# Patient Record
Sex: MALE | Race: WHITE | NOT HISPANIC OR LATINO | Employment: OTHER | URBAN - METROPOLITAN AREA
[De-identification: names, ages, dates, MRNs, and addresses within clinical notes are randomized per-mention and may not be internally consistent; named-entity substitution may affect disease eponyms.]

---

## 2024-07-25 ENCOUNTER — APPOINTMENT (OUTPATIENT)
Dept: GENERAL RADIOLOGY | Facility: CLINIC | Age: 59
End: 2024-07-25
Attending: EMERGENCY MEDICINE
Payer: COMMERCIAL

## 2024-07-25 ENCOUNTER — APPOINTMENT (OUTPATIENT)
Dept: CARDIOLOGY | Facility: CLINIC | Age: 59
End: 2024-07-25
Attending: NURSE PRACTITIONER
Payer: COMMERCIAL

## 2024-07-25 ENCOUNTER — HOSPITAL ENCOUNTER (OUTPATIENT)
Facility: CLINIC | Age: 59
Setting detail: OBSERVATION
Discharge: HOME | End: 2024-07-26
Attending: EMERGENCY MEDICINE | Admitting: STUDENT IN AN ORGANIZED HEALTH CARE EDUCATION/TRAINING PROGRAM
Payer: COMMERCIAL

## 2024-07-25 DIAGNOSIS — R07.9 CHEST PAIN, UNSPECIFIED TYPE: ICD-10-CM

## 2024-07-25 DIAGNOSIS — I20.0 UNSTABLE ANGINA (H): ICD-10-CM

## 2024-07-25 LAB
ACT BLD: 308 SECONDS (ref 74–150)
ANION GAP SERPL CALCULATED.3IONS-SCNC: 9 MMOL/L (ref 7–15)
ATRIAL RATE - MUSE: 72 BPM
BASOPHILS # BLD AUTO: 0.1 10E3/UL (ref 0–0.2)
BASOPHILS NFR BLD AUTO: 1 %
BUN SERPL-MCNC: 17.3 MG/DL (ref 6–20)
CALCIUM SERPL-MCNC: 9.2 MG/DL (ref 8.8–10.4)
CHLORIDE SERPL-SCNC: 105 MMOL/L (ref 98–107)
CHOLEST SERPL-MCNC: 130 MG/DL
CREAT SERPL-MCNC: 1.21 MG/DL (ref 0.67–1.17)
DIASTOLIC BLOOD PRESSURE - MUSE: NORMAL MMHG
EGFRCR SERPLBLD CKD-EPI 2021: 70 ML/MIN/1.73M2
EOSINOPHIL # BLD AUTO: 0.1 10E3/UL (ref 0–0.7)
EOSINOPHIL NFR BLD AUTO: 1 %
ERYTHROCYTE [DISTWIDTH] IN BLOOD BY AUTOMATED COUNT: 12.6 % (ref 10–15)
GLUCOSE SERPL-MCNC: 102 MG/DL (ref 70–99)
HBA1C MFR BLD: 5.1 %
HCO3 SERPL-SCNC: 24 MMOL/L (ref 22–29)
HCT VFR BLD AUTO: 46.6 % (ref 40–53)
HDLC SERPL-MCNC: 23 MG/DL
HGB BLD-MCNC: 16 G/DL (ref 13.3–17.7)
HOLD SPECIMEN: 0
HOLD SPECIMEN: 0
HOLD SPECIMEN: NORMAL
IMM GRANULOCYTES # BLD: 0 10E3/UL
IMM GRANULOCYTES NFR BLD: 1 %
INTERPRETATION ECG - MUSE: NORMAL
LDLC SERPL CALC-MCNC: 91 MG/DL
LVEF ECHO: NORMAL
LYMPHOCYTES # BLD AUTO: 1.9 10E3/UL (ref 0.8–5.3)
LYMPHOCYTES NFR BLD AUTO: 29 %
MCH RBC QN AUTO: 31.7 PG (ref 26.5–33)
MCHC RBC AUTO-ENTMCNC: 34.3 G/DL (ref 31.5–36.5)
MCV RBC AUTO: 92 FL (ref 78–100)
MONOCYTES # BLD AUTO: 0.5 10E3/UL (ref 0–1.3)
MONOCYTES NFR BLD AUTO: 8 %
NEUTROPHILS # BLD AUTO: 4 10E3/UL (ref 1.6–8.3)
NEUTROPHILS NFR BLD AUTO: 61 %
NONHDLC SERPL-MCNC: 107 MG/DL
NRBC # BLD AUTO: 0 10E3/UL
NRBC BLD AUTO-RTO: 0 /100
P AXIS - MUSE: 52 DEGREES
PLATELET # BLD AUTO: 281 10E3/UL (ref 150–450)
POTASSIUM SERPL-SCNC: 4 MMOL/L (ref 3.4–5.3)
PR INTERVAL - MUSE: 156 MS
QRS DURATION - MUSE: 84 MS
QT - MUSE: 360 MS
QTC - MUSE: 394 MS
R AXIS - MUSE: 0 DEGREES
RBC # BLD AUTO: 5.05 10E6/UL (ref 4.4–5.9)
SODIUM SERPL-SCNC: 138 MMOL/L (ref 135–145)
SYSTOLIC BLOOD PRESSURE - MUSE: NORMAL MMHG
T AXIS - MUSE: 30 DEGREES
TRIGL SERPL-MCNC: 79 MG/DL
TROPONIN T SERPL HS-MCNC: 17 NG/L
TROPONIN T SERPL HS-MCNC: 17 NG/L
UFH PPP CHRO-ACNC: 1.01 IU/ML
VENTRICULAR RATE- MUSE: 72 BPM
WBC # BLD AUTO: 6.6 10E3/UL (ref 4–11)

## 2024-07-25 PROCEDURE — 250N000011 HC RX IP 250 OP 636: Performed by: INTERNAL MEDICINE

## 2024-07-25 PROCEDURE — 93306 TTE W/DOPPLER COMPLETE: CPT | Mod: 26 | Performed by: INTERNAL MEDICINE

## 2024-07-25 PROCEDURE — 250N000013 HC RX MED GY IP 250 OP 250 PS 637: Performed by: INTERNAL MEDICINE

## 2024-07-25 PROCEDURE — 93454 CORONARY ARTERY ANGIO S&I: CPT | Mod: 26 | Performed by: INTERNAL MEDICINE

## 2024-07-25 PROCEDURE — 96374 THER/PROPH/DIAG INJ IV PUSH: CPT | Mod: 59

## 2024-07-25 PROCEDURE — 99223 1ST HOSP IP/OBS HIGH 75: CPT | Performed by: NURSE PRACTITIONER

## 2024-07-25 PROCEDURE — G0378 HOSPITAL OBSERVATION PER HR: HCPCS

## 2024-07-25 PROCEDURE — 82465 ASSAY BLD/SERUM CHOLESTEROL: CPT | Performed by: NURSE PRACTITIONER

## 2024-07-25 PROCEDURE — 82374 ASSAY BLOOD CARBON DIOXIDE: CPT | Performed by: EMERGENCY MEDICINE

## 2024-07-25 PROCEDURE — C1874 STENT, COATED/COV W/DEL SYS: HCPCS | Performed by: INTERNAL MEDICINE

## 2024-07-25 PROCEDURE — C8929 TTE W OR WO FOL WCON,DOPPLER: HCPCS

## 2024-07-25 PROCEDURE — 85520 HEPARIN ASSAY: CPT | Performed by: NURSE PRACTITIONER

## 2024-07-25 PROCEDURE — 99152 MOD SED SAME PHYS/QHP 5/>YRS: CPT | Mod: GC | Performed by: INTERNAL MEDICINE

## 2024-07-25 PROCEDURE — 99204 OFFICE O/P NEW MOD 45 MIN: CPT | Mod: 25 | Performed by: INTERNAL MEDICINE

## 2024-07-25 PROCEDURE — 85025 COMPLETE CBC W/AUTO DIFF WBC: CPT | Performed by: EMERGENCY MEDICINE

## 2024-07-25 PROCEDURE — 250N000013 HC RX MED GY IP 250 OP 250 PS 637: Performed by: EMERGENCY MEDICINE

## 2024-07-25 PROCEDURE — 84484 ASSAY OF TROPONIN QUANT: CPT | Performed by: EMERGENCY MEDICINE

## 2024-07-25 PROCEDURE — 71046 X-RAY EXAM CHEST 2 VIEWS: CPT

## 2024-07-25 PROCEDURE — 93010 ELECTROCARDIOGRAM REPORT: CPT | Performed by: INTERNAL MEDICINE

## 2024-07-25 PROCEDURE — 250N000013 HC RX MED GY IP 250 OP 250 PS 637: Performed by: STUDENT IN AN ORGANIZED HEALTH CARE EDUCATION/TRAINING PROGRAM

## 2024-07-25 PROCEDURE — C1760 CLOSURE DEV, VASC: HCPCS | Performed by: INTERNAL MEDICINE

## 2024-07-25 PROCEDURE — 85347 COAGULATION TIME ACTIVATED: CPT

## 2024-07-25 PROCEDURE — C9600 PERC DRUG-EL COR STENT SING: HCPCS | Performed by: INTERNAL MEDICINE

## 2024-07-25 PROCEDURE — 92928 PRQ TCAT PLMT NTRAC ST 1 LES: CPT | Mod: LD | Performed by: INTERNAL MEDICINE

## 2024-07-25 PROCEDURE — 250N000009 HC RX 250: Performed by: INTERNAL MEDICINE

## 2024-07-25 PROCEDURE — 83036 HEMOGLOBIN GLYCOSYLATED A1C: CPT | Performed by: NURSE PRACTITIONER

## 2024-07-25 PROCEDURE — 93454 CORONARY ARTERY ANGIO S&I: CPT | Performed by: INTERNAL MEDICINE

## 2024-07-25 PROCEDURE — 255N000002 HC RX 255 OP 636: Performed by: STUDENT IN AN ORGANIZED HEALTH CARE EDUCATION/TRAINING PROGRAM

## 2024-07-25 PROCEDURE — 36415 COLL VENOUS BLD VENIPUNCTURE: CPT | Performed by: EMERGENCY MEDICINE

## 2024-07-25 PROCEDURE — 272N000001 HC OR GENERAL SUPPLY STERILE: Performed by: INTERNAL MEDICINE

## 2024-07-25 PROCEDURE — 99152 MOD SED SAME PHYS/QHP 5/>YRS: CPT | Performed by: INTERNAL MEDICINE

## 2024-07-25 PROCEDURE — 93005 ELECTROCARDIOGRAM TRACING: CPT

## 2024-07-25 PROCEDURE — 93005 ELECTROCARDIOGRAM TRACING: CPT | Mod: 59

## 2024-07-25 PROCEDURE — 999N000208 ECHOCARDIOGRAM COMPLETE

## 2024-07-25 PROCEDURE — 258N000003 HC RX IP 258 OP 636: Performed by: STUDENT IN AN ORGANIZED HEALTH CARE EDUCATION/TRAINING PROGRAM

## 2024-07-25 PROCEDURE — C1894 INTRO/SHEATH, NON-LASER: HCPCS | Performed by: INTERNAL MEDICINE

## 2024-07-25 PROCEDURE — 99153 MOD SED SAME PHYS/QHP EA: CPT | Performed by: INTERNAL MEDICINE

## 2024-07-25 PROCEDURE — 99285 EMERGENCY DEPT VISIT HI MDM: CPT | Mod: 25

## 2024-07-25 PROCEDURE — 36415 COLL VENOUS BLD VENIPUNCTURE: CPT | Performed by: NURSE PRACTITIONER

## 2024-07-25 PROCEDURE — C1769 GUIDE WIRE: HCPCS | Performed by: INTERNAL MEDICINE

## 2024-07-25 PROCEDURE — C1725 CATH, TRANSLUMIN NON-LASER: HCPCS | Performed by: INTERNAL MEDICINE

## 2024-07-25 PROCEDURE — 250N000011 HC RX IP 250 OP 636: Performed by: NURSE PRACTITIONER

## 2024-07-25 PROCEDURE — C1887 CATHETER, GUIDING: HCPCS | Performed by: INTERNAL MEDICINE

## 2024-07-25 PROCEDURE — 96361 HYDRATE IV INFUSION ADD-ON: CPT

## 2024-07-25 PROCEDURE — 250N000011 HC RX IP 250 OP 636: Performed by: STUDENT IN AN ORGANIZED HEALTH CARE EDUCATION/TRAINING PROGRAM

## 2024-07-25 DEVICE — CLOSURE ANGIOSEAL 6FR 610130: Type: IMPLANTABLE DEVICE | Status: FUNCTIONAL

## 2024-07-25 DEVICE — STENT COR ONYX FRONTIER 38X3.50MM ONYXNG35038UX: Type: IMPLANTABLE DEVICE | Status: FUNCTIONAL

## 2024-07-25 RX ORDER — FENTANYL CITRATE 50 UG/ML
INJECTION, SOLUTION INTRAMUSCULAR; INTRAVENOUS
Status: DISCONTINUED | OUTPATIENT
Start: 2024-07-25 | End: 2024-07-25 | Stop reason: HOSPADM

## 2024-07-25 RX ORDER — HYDRALAZINE HYDROCHLORIDE 20 MG/ML
10 INJECTION INTRAMUSCULAR; INTRAVENOUS EVERY 4 HOURS PRN
Status: DISCONTINUED | OUTPATIENT
Start: 2024-07-25 | End: 2024-07-26 | Stop reason: HOSPADM

## 2024-07-25 RX ORDER — NALOXONE HYDROCHLORIDE 0.4 MG/ML
0.2 INJECTION, SOLUTION INTRAMUSCULAR; INTRAVENOUS; SUBCUTANEOUS
Status: DISCONTINUED | OUTPATIENT
Start: 2024-07-25 | End: 2024-07-26 | Stop reason: HOSPADM

## 2024-07-25 RX ORDER — ASPIRIN 81 MG/1
243 TABLET, CHEWABLE ORAL ONCE
Status: CANCELLED | OUTPATIENT
Start: 2024-07-25

## 2024-07-25 RX ORDER — OXYCODONE HYDROCHLORIDE 5 MG/1
5 TABLET ORAL EVERY 4 HOURS PRN
Status: DISCONTINUED | OUTPATIENT
Start: 2024-07-25 | End: 2024-07-26 | Stop reason: HOSPADM

## 2024-07-25 RX ORDER — HEPARIN SODIUM 10000 [USP'U]/100ML
0-5000 INJECTION, SOLUTION INTRAVENOUS CONTINUOUS
Status: DISCONTINUED | OUTPATIENT
Start: 2024-07-25 | End: 2024-07-25

## 2024-07-25 RX ORDER — IOPAMIDOL 755 MG/ML
INJECTION, SOLUTION INTRAVASCULAR
Status: DISCONTINUED | OUTPATIENT
Start: 2024-07-25 | End: 2024-07-25 | Stop reason: HOSPADM

## 2024-07-25 RX ORDER — NITROGLYCERIN 0.4 MG/1
0.4 TABLET SUBLINGUAL EVERY 5 MIN PRN
Status: DISCONTINUED | OUTPATIENT
Start: 2024-07-25 | End: 2024-07-26 | Stop reason: HOSPADM

## 2024-07-25 RX ORDER — ACETAMINOPHEN 325 MG/1
650 TABLET ORAL EVERY 4 HOURS PRN
Status: DISCONTINUED | OUTPATIENT
Start: 2024-07-25 | End: 2024-07-26 | Stop reason: HOSPADM

## 2024-07-25 RX ORDER — NALOXONE HYDROCHLORIDE 0.4 MG/ML
0.4 INJECTION, SOLUTION INTRAMUSCULAR; INTRAVENOUS; SUBCUTANEOUS
Status: DISCONTINUED | OUTPATIENT
Start: 2024-07-25 | End: 2024-07-26 | Stop reason: HOSPADM

## 2024-07-25 RX ORDER — HEPARIN SODIUM 1000 [USP'U]/ML
INJECTION, SOLUTION INTRAVENOUS; SUBCUTANEOUS
Status: DISCONTINUED | OUTPATIENT
Start: 2024-07-25 | End: 2024-07-25 | Stop reason: HOSPADM

## 2024-07-25 RX ORDER — PROCHLORPERAZINE 25 MG
25 SUPPOSITORY, RECTAL RECTAL EVERY 12 HOURS PRN
Status: DISCONTINUED | OUTPATIENT
Start: 2024-07-25 | End: 2024-07-26 | Stop reason: HOSPADM

## 2024-07-25 RX ORDER — SODIUM CHLORIDE 9 MG/ML
INJECTION, SOLUTION INTRAVENOUS CONTINUOUS
Status: ACTIVE | OUTPATIENT
Start: 2024-07-25 | End: 2024-07-25

## 2024-07-25 RX ORDER — ROSUVASTATIN CALCIUM 20 MG/1
20 TABLET, COATED ORAL DAILY
Status: DISCONTINUED | OUTPATIENT
Start: 2024-07-25 | End: 2024-07-26 | Stop reason: HOSPADM

## 2024-07-25 RX ORDER — FLUMAZENIL 0.1 MG/ML
0.2 INJECTION, SOLUTION INTRAVENOUS
Status: ACTIVE | OUTPATIENT
Start: 2024-07-25 | End: 2024-07-26

## 2024-07-25 RX ORDER — ONDANSETRON 4 MG/1
4 TABLET, ORALLY DISINTEGRATING ORAL EVERY 6 HOURS PRN
Status: DISCONTINUED | OUTPATIENT
Start: 2024-07-25 | End: 2024-07-26 | Stop reason: HOSPADM

## 2024-07-25 RX ORDER — LIDOCAINE 40 MG/G
CREAM TOPICAL
Status: CANCELLED | OUTPATIENT
Start: 2024-07-25

## 2024-07-25 RX ORDER — OXYCODONE HYDROCHLORIDE 5 MG/1
10 TABLET ORAL EVERY 4 HOURS PRN
Status: DISCONTINUED | OUTPATIENT
Start: 2024-07-25 | End: 2024-07-26 | Stop reason: HOSPADM

## 2024-07-25 RX ORDER — NALOXONE HYDROCHLORIDE 0.4 MG/ML
0.4 INJECTION, SOLUTION INTRAMUSCULAR; INTRAVENOUS; SUBCUTANEOUS
Status: ACTIVE | OUTPATIENT
Start: 2024-07-25 | End: 2024-07-26

## 2024-07-25 RX ORDER — NITROGLYCERIN 0.4 MG/1
0.4 TABLET SUBLINGUAL EVERY 5 MIN PRN
Status: DISCONTINUED | OUTPATIENT
Start: 2024-07-25 | End: 2024-07-25

## 2024-07-25 RX ORDER — PROCHLORPERAZINE MALEATE 10 MG
10 TABLET ORAL EVERY 6 HOURS PRN
Status: DISCONTINUED | OUTPATIENT
Start: 2024-07-25 | End: 2024-07-26 | Stop reason: HOSPADM

## 2024-07-25 RX ORDER — POTASSIUM CHLORIDE 1500 MG/1
20 TABLET, EXTENDED RELEASE ORAL
Status: CANCELLED | OUTPATIENT
Start: 2024-07-25

## 2024-07-25 RX ORDER — METOPROLOL TARTRATE 1 MG/ML
5 INJECTION, SOLUTION INTRAVENOUS
Status: DISCONTINUED | OUTPATIENT
Start: 2024-07-25 | End: 2024-07-26 | Stop reason: HOSPADM

## 2024-07-25 RX ORDER — HYDRALAZINE HYDROCHLORIDE 10 MG/1
10 TABLET, FILM COATED ORAL EVERY 4 HOURS PRN
Status: DISCONTINUED | OUTPATIENT
Start: 2024-07-25 | End: 2024-07-26 | Stop reason: HOSPADM

## 2024-07-25 RX ORDER — MORPHINE SULFATE 2 MG/ML
1 INJECTION, SOLUTION INTRAMUSCULAR; INTRAVENOUS
Status: DISCONTINUED | OUTPATIENT
Start: 2024-07-25 | End: 2024-07-26 | Stop reason: HOSPADM

## 2024-07-25 RX ORDER — AMOXICILLIN 250 MG
1 CAPSULE ORAL 2 TIMES DAILY PRN
Status: DISCONTINUED | OUTPATIENT
Start: 2024-07-25 | End: 2024-07-26 | Stop reason: HOSPADM

## 2024-07-25 RX ORDER — ONDANSETRON 2 MG/ML
4 INJECTION INTRAMUSCULAR; INTRAVENOUS EVERY 6 HOURS PRN
Status: DISCONTINUED | OUTPATIENT
Start: 2024-07-25 | End: 2024-07-26 | Stop reason: HOSPADM

## 2024-07-25 RX ORDER — ASPIRIN 325 MG
325 TABLET ORAL ONCE
Status: CANCELLED | OUTPATIENT
Start: 2024-07-25 | End: 2024-07-25

## 2024-07-25 RX ORDER — ASPIRIN 81 MG/1
81 TABLET ORAL DAILY
Status: DISCONTINUED | OUTPATIENT
Start: 2024-07-26 | End: 2024-07-26 | Stop reason: HOSPADM

## 2024-07-25 RX ORDER — ASPIRIN 81 MG/1
81 TABLET, CHEWABLE ORAL ONCE
Status: COMPLETED | OUTPATIENT
Start: 2024-07-25 | End: 2024-07-25

## 2024-07-25 RX ORDER — LORAZEPAM 0.5 MG/1
0.5 TABLET ORAL
Status: CANCELLED | OUTPATIENT
Start: 2024-07-25

## 2024-07-25 RX ORDER — LORAZEPAM 2 MG/ML
0.5 INJECTION INTRAMUSCULAR
Status: CANCELLED | OUTPATIENT
Start: 2024-07-25

## 2024-07-25 RX ORDER — SODIUM CHLORIDE 9 MG/ML
INJECTION, SOLUTION INTRAVENOUS CONTINUOUS
Status: CANCELLED | OUTPATIENT
Start: 2024-07-25

## 2024-07-25 RX ORDER — NALOXONE HYDROCHLORIDE 0.4 MG/ML
0.2 INJECTION, SOLUTION INTRAMUSCULAR; INTRAVENOUS; SUBCUTANEOUS
Status: ACTIVE | OUTPATIENT
Start: 2024-07-25 | End: 2024-07-26

## 2024-07-25 RX ORDER — AMOXICILLIN 250 MG
2 CAPSULE ORAL 2 TIMES DAILY PRN
Status: DISCONTINUED | OUTPATIENT
Start: 2024-07-25 | End: 2024-07-26 | Stop reason: HOSPADM

## 2024-07-25 RX ORDER — FENTANYL CITRATE 50 UG/ML
25 INJECTION, SOLUTION INTRAMUSCULAR; INTRAVENOUS
Status: DISCONTINUED | OUTPATIENT
Start: 2024-07-25 | End: 2024-07-26 | Stop reason: HOSPADM

## 2024-07-25 RX ORDER — ATROPINE SULFATE 0.1 MG/ML
0.5 INJECTION INTRAVENOUS
Status: ACTIVE | OUTPATIENT
Start: 2024-07-25 | End: 2024-07-26

## 2024-07-25 RX ORDER — ASPIRIN 81 MG/1
162 TABLET, CHEWABLE ORAL ONCE
Status: COMPLETED | OUTPATIENT
Start: 2024-07-25 | End: 2024-07-25

## 2024-07-25 RX ORDER — MORPHINE SULFATE 2 MG/ML
2 INJECTION, SOLUTION INTRAMUSCULAR; INTRAVENOUS
Status: DISCONTINUED | OUTPATIENT
Start: 2024-07-25 | End: 2024-07-26 | Stop reason: HOSPADM

## 2024-07-25 RX ORDER — ASPIRIN 81 MG/1
81 TABLET ORAL DAILY
Qty: 30 TABLET | Refills: 3 | Status: SHIPPED | OUTPATIENT
Start: 2024-07-26

## 2024-07-25 RX ADMIN — ROSUVASTATIN CALCIUM 20 MG: 20 TABLET, FILM COATED ORAL at 17:59

## 2024-07-25 RX ADMIN — NITROGLYCERIN 0.4 MG: 0.4 TABLET SUBLINGUAL at 09:50

## 2024-07-25 RX ADMIN — ASPIRIN 81 MG CHEWABLE TABLET 81 MG: 81 TABLET CHEWABLE at 17:59

## 2024-07-25 RX ADMIN — HUMAN ALBUMIN MICROSPHERES AND PERFLUTREN 9 ML: 10; .22 INJECTION, SOLUTION INTRAVENOUS at 12:47

## 2024-07-25 RX ADMIN — ASPIRIN 81 MG CHEWABLE TABLET 162 MG: 81 TABLET CHEWABLE at 09:50

## 2024-07-25 RX ADMIN — HYDRALAZINE HYDROCHLORIDE 10 MG: 20 INJECTION INTRAMUSCULAR; INTRAVENOUS at 17:55

## 2024-07-25 RX ADMIN — ACETAMINOPHEN 650 MG: 325 TABLET, FILM COATED ORAL at 20:20

## 2024-07-25 RX ADMIN — HEPARIN SODIUM 1000 UNITS/HR: 10000 INJECTION, SOLUTION INTRAVENOUS at 13:15

## 2024-07-25 RX ADMIN — SODIUM CHLORIDE: 9 INJECTION, SOLUTION INTRAVENOUS at 16:38

## 2024-07-25 ASSESSMENT — ACTIVITIES OF DAILY LIVING (ADL)
ADLS_ACUITY_SCORE: 35
ADLS_ACUITY_SCORE: 36
ADLS_ACUITY_SCORE: 36
ADLS_ACUITY_SCORE: 35
ADLS_ACUITY_SCORE: 36
ADLS_ACUITY_SCORE: 35
ADLS_ACUITY_SCORE: 36
ADLS_ACUITY_SCORE: 36

## 2024-07-25 NOTE — ED TRIAGE NOTES
Pt has had chest pain for the last couple of days   Pt primary told him to go to get checked
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
## 2024-07-25 NOTE — Clinical Note
Stent deployed in the middle left anterior descending. Max pressure = 12 ayesha. Total duration = 30 seconds.

## 2024-07-25 NOTE — PHARMACY-ADMISSION MEDICATION HISTORY
Pharmacist Admission Medication History    Admission medication history is complete. The information provided in this note is only as accurate as the sources available at the time of the update.    Information Source(s): Patient and CareEverywhere/SureScripts via in-person    Pertinent Information: none    Changes made to PTA medication list:  Added: Advil  Deleted: None  Changed: None    Allergies reviewed with patient and updates made in EHR: yes    Medication History Completed By: Samaria Nguyen RPH 7/25/2024 11:40 AM    PTA Med List   Medication Sig Last Dose    Ibuprofen (ADVIL PO) Take 2 tablets by mouth as needed for other (pain) 7/25/2024 at ~0600

## 2024-07-25 NOTE — PLAN OF CARE
PRIMARY DIAGNOSIS: CHEST PAIN  OUTPATIENT/OBSERVATION GOALS TO BE MET BEFORE DISCHARGE:  1. Ruled out acute coronary syndrome (negative or stable Troponin):  Yes  2. Pain Status: Pt w/3/4 chest pressure after PCI. EKG 12 lead ordered STAT. EKG 12 lead unchanged.  Pt declines prn medication for pain @this time.  3. Appropriate provocative testing performed: Yes  - Stress Test Procedure: Echo  - Interpretation of cardiac rhythm per telemetry tech: SR    4. Cleared by Consultants (if applicable):Yes  5. Return to near baseline physical activity: Yes  Discharge Planner Nurse   Safe discharge environment identified: No  Barriers to discharge: No       Entered by: Ayse Livingston RN 07/25/2024 6:43 PM     Please review provider order for any additional goals.   Nurse to notify provider when observation goals have been met and patient is ready for discharge.

## 2024-07-25 NOTE — Clinical Note
The first balloon was inserted into the left anterior descending and middle left anterior descending.Max pressure = 6 ayesha. Total duration = 12 seconds.     Max pressure = 10 ayesha. Total duration = 13 seconds.    Balloon reinflated a second time: Max pressure = 10 ayesha. Total duration = 13 seconds.
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
## 2024-07-25 NOTE — PROGRESS NOTES
Observation goals  PRIOR TO DISCHARGE        Comments: -diagnostic tests and consults completed and resulted not met, angio pending  -vital signs normal or at patient baseline not met, HTN  -tolerating oral intake to maintain hydration met  -adequate pain control on oral analgesics met  Nurse to notify provider when observation goals have been met and patient is ready for discharge.        Top portion must ALWAYS be completed  PRIMARY Concern: chest pain  SAFETY RISK Concerns (fall risk, behaviors, etc.): heparin, dizziness      Isolation/Type: none  Tests/Procedures for NEXT shift: angio  Consults? (Pending/following, signed-off?) cards  Where is patient from? (Home, TCU, etc.): home  Other Important info for NEXT shift: heparin started this shift. Recheck 1915  Anticipated DC date & active delays: TBD  _____________________________________________________________________________  SUMMARY NOTE:              (either paste note here OR complete the info below- RN to choose one- delete info below if not used)  Orientation/Cognitive: A/Ox4  Observation Goals (Met/ Not Met): not met  Mobility Level/Assist Equipment: SBA for dizziness at times and heparin drip, calls appropriately  Antibiotics & Plan (IV/po, length of tx left): none  Pain Management: mild chest pain at times  Tele/VS/O2: VSS RA x HTN  ABNL Lab/BG: cr 1.21  Diet: NPO x ice  Bowel/Bladder: continent  Skin Concerns: wdl  Drains/Devices: PIV infusing heparin 1000 unit(s)/hr  Patient Stated Goal for Today: have angio, eat

## 2024-07-25 NOTE — PROGRESS NOTES
Observation goals  PRIOR TO DISCHARGE        Comments: -diagnostic tests and consults completed and resulted not met, angio pending  -vital signs normal or at patient baseline not met, HTN  -tolerating oral intake to maintain hydration met  -adequate pain control on oral analgesics met  Nurse to notify provider when observation goals have been met and patient is ready for discharge.

## 2024-07-25 NOTE — Clinical Note
The first balloon was inserted into the left anterior descending and middle left anterior descending.Max pressure = 16 ayesha. Total duration = 23 seconds.     Max pressure = 20 ayesha. Total duration = 30 seconds.    Balloon reinflated a second time: Max pressure = 20 ayesha. Total duration = 30 seconds.

## 2024-07-25 NOTE — CONSULTS
Cardiology Consultation      Jose Munson MRN# [id number]   YOB: 1965 Age: 58 year old   Date of Admission: 7/25/2024     Reason for consult: Chest discomfort consistent with unstable angina           Assessment and Plan:     58-year-old gentleman with a past medical history significant for dyslipidemia and a strong family history of coronary artery disease who presented to [facility] with a 3 to 4-day history of exertional chest discomfort that gradually worsened and intensity and duration.    IMPRESSION:    Accelerating chest discomfort consistent with unstable angina.  Dyslipidemia.  Strong family history of coronary disease.    PLAN    -I discussed the options of further evaluation with the patient in detail.  We have agreed to proceed with definitive evaluation with coronary angiography.  I have explained the indications, risks and benefits of coronary angiography to the patient in detail who is agreeable with proceeding.    -An echocardiogram has been performed.  Although the formal report is pending upon my preliminary review biventricular systolic function appears normal.    -Will add on LP(a) and APO B to labs tomorrow.  Start rosuvastatin 20 mg daily.                 Chief Complaint:   Chest Pain           History of Present Illness:   This patient is a 58 year old male with a strong family history of coronary artery disease as well as mild dyslipidemia and hypertension who has been experiencing a 3 to 4-day history of exertional chest discomfort.  He is a very active individual who walks 4 to 5 miles on a regular basis.  Earlier this week he started noticing substernal chest tightness that was worsened with exertion and relieved by rest.    Due to worsening symptoms he presented to the emergency department and received sublingual nitroglycerin which improved his symptoms.  At the time I saw him he was chest pain-free.    Regarding his family history, his grandfather experienced  2 myocardial infarctions and his brother recently experienced an MI.         Physical Exam:   Vitals were reviewed  Blood pressure (!) 159/98, pulse 56, temperature 97.7  F (36.5  C), temperature source Oral, resp. rate 16, height 1.829 m (6'), weight 82.5 kg (181 lb 14.1 oz), SpO2 100%.  Temperatures:  Current - Temp: 97.7  F (36.5  C); Max - Temp  Av.4  F (36.3  C)  Min: 97  F (36.1  C)  Max: 97.7  F (36.5  C)  Respiration range: Resp  Av.8  Min: 12  Max: 16  Pulse range: Pulse  Av.5  Min: 53  Max: 70  Blood pressure range: Systolic (24hrs), Av , Min:126 , Max:159   ; Diastolic (24hrs), Av, Min:91, Max:113    Pulse oximetry range: SpO2  Av.8 %  Min: 92 %  Max: 100 %  No intake or output data in the 24 hours ending 24 1245  Constitutional:   awake, alert, cooperative, no apparent distress, and appears stated age     Eyes:   Lids and lashes normal, pupils equal, round and reactive to light, extra ocular muscles intact, sclera clear, conjunctiva normal     Neck:   supple, symmetrical, trachea midline, no JVD     Back:   symmetric     Lungs:   No increased work of breathing, good air exchange, clear to auscultation bilaterally, no crackles or wheezing       Cardiovascular:   Normal apical impulse, regular rate and rhythm, normal S1 and S2, no S3 or S4, and no murmur noted.      Abdomen:   non-tender     Musculoskeletal:   motor strength is 5 out of 5 all extremities bilaterally     Neurologic:   Grossly nonfocal     Skin:   no bruising or bleeding     Additional findings:     No edema                 Past Medical History:   I have reviewed this patient's past medical history  No past medical history on file.          Past Surgical History:   I have reviewed this patient's past surgical history  No past surgical history on file.            Social History:   I have reviewed this patient's social history  Social History     Tobacco Use    Smoking status: Not on file    Smokeless  tobacco: Not on file   Substance Use Topics    Alcohol use: Not on file             Family History:   I have reviewed this patient's family history  No family history on file.          Allergies:     Allergies   Allergen Reactions    Aleve [Naproxen] Hives             Medications:   I have reviewed this patient's current medications  Medications Prior to Admission   Medication Sig Dispense Refill Last Dose    Ibuprofen (ADVIL PO) Take 2 tablets by mouth as needed for other (pain)   7/25/2024 at ~0600     Current Facility-Administered Medications   Medication Dose Route Frequency Provider Last Rate Last Admin    heparin 25,000 units in 0.45% NaCl 250 mL ANTICOAGULANT infusion  0-5,000 Units/hr Intravenous Continuous Alisha Sorensen APRN CNP        heparin loading dose for LOW INTENSITY TREATMENT * Give BEFORE starting heparin infusion  60 Units/kg Intravenous Once Alisha Sorensen APRN CNP        hydrALAZINE (APRESOLINE) tablet 10 mg  10 mg Oral Q4H PRN Alisha Sorensen APRN CNP        Or    hydrALAZINE (APRESOLINE) injection 10 mg  10 mg Intravenous Q4H PRN Alisha Sorensen APRN CNP        melatonin tablet 5 mg  5 mg Oral At Bedtime PRN Alisha Sorensen APRN CNP        morphine (PF) injection 1 mg  1 mg Intravenous Q2H PRN Alisha Sorensen APRN CNP        morphine (PF) injection 2 mg  2 mg Intravenous Q2H PRN Alisha Sorensen APRN CNP        naloxone (NARCAN) injection 0.2 mg  0.2 mg Intravenous Q2 Min PRN Eliana De La Rosa DO        Or    naloxone (NARCAN) injection 0.4 mg  0.4 mg Intravenous Q2 Min PRN Eliana De La Rosa, DO        Or    naloxone (NARCAN) injection 0.2 mg  0.2 mg Intramuscular Q2 Min PRN Eliana De La Rosa, DO        Or    naloxone (NARCAN) injection 0.4 mg  0.4 mg Intramuscular Q2 Min PRN Eliana De La Rosa E, DO        nitroGLYcerin (NITROSTAT) sublingual tablet 0.4 mg  0.4 mg Sublingual Q5 Min PRN Thaddeus Varela MD        Patient is already receiving  anticoagulation with heparin, enoxaparin (LOVENOX), warfarin (COUMADIN)  or other anticoagulant medication   Does not apply Continuous Alisha Ferris APRN CNP        prochlorperazine (COMPAZINE) injection 10 mg  10 mg Intravenous Q6H Alisha Ferris APRN CNP        Or    prochlorperazine (COMPAZINE) tablet 10 mg  10 mg Oral Q6H Alisha Ferris APRN CNP        Or    prochlorperazine (COMPAZINE) suppository 25 mg  25 mg Rectal Q12H PRAlisha Vang APRN CNP        senna-docusate (SENOKOT-S/PERICOLACE) 8.6-50 MG per tablet 1 tablet  1 tablet Oral BID Alisha Ferris APRN CNP        Or    senna-docusate (SENOKOT-S/PERICOLACE) 8.6-50 MG per tablet 2 tablet  2 tablet Oral BID Alisha Ferris APRN CNP                 Review of Systems:     The 10 point Review of Systems is negative other than noted in the HPI            Data:   All laboratory data reviewed  Results for orders placed or performed during the hospital encounter of 07/25/24 (from the past 24 hour(s))   [facility] Draw    Narrative    The following orders were created for panel order [facility].  Procedure                               Abnormality         Status                     ---------                               -----------         ------                     Extra Blue Top Tube[[id number]]                              Final result               Extra Green Top (Lithium...[[id number]]                      Final result               Extra Purple Top Tube[[id number]]                            Final result                 Please view results for these tests on the individual orders.   Extra Blue Top Tube   Result Value Ref Range    Hold Specimen JIC    Extra Green Top (Lithium Heparin) Tube   Result Value Ref Range    Hold Specimen 0    Extra Purple Top Tube   Result Value Ref Range    Hold Specimen 0    CBC with Platelets & Differential    Narrative    The following orders were created for panel order CBC with  Platelets & Differential.  Procedure                               Abnormality         Status                     ---------                               -----------         ------                     CBC with platelets and d...[[id number]]                      Final result                 Please view results for these tests on the individual orders.   Basic metabolic panel   Result Value Ref Range    Sodium 138 135 - 145 mmol/L    Potassium 4.0 3.4 - 5.3 mmol/L    Chloride 105 98 - 107 mmol/L    Carbon Dioxide (CO2) 24 22 - 29 mmol/L    Anion Gap 9 7 - 15 mmol/L    Urea Nitrogen 17.3 6.0 - 20.0 mg/dL    Creatinine 1.21 (H) 0.67 - 1.17 mg/dL    GFR Estimate 70 >60 mL/min/1.73m2    Calcium 9.2 8.8 - 10.4 mg/dL    Glucose 102 (H) 70 - 99 mg/dL   Troponin T, High Sensitivity   Result Value Ref Range    Troponin T, High Sensitivity 17 <=22 ng/L   CBC with platelets and differential   Result Value Ref Range    WBC Count 6.6 4.0 - 11.0 10e3/uL    RBC Count 5.05 4.40 - 5.90 10e6/uL    Hemoglobin 16.0 13.3 - 17.7 g/dL    Hematocrit 46.6 40.0 - 53.0 %    MCV 92 78 - 100 fL    MCH 31.7 26.5 - 33.0 pg    MCHC 34.3 31.5 - 36.5 g/dL    RDW 12.6 10.0 - 15.0 %    Platelet Count 281 150 - 450 10e3/uL    % Neutrophils 61 %    % Lymphocytes 29 %    % Monocytes 8 %    % Eosinophils 1 %    % Basophils 1 %    % Immature Granulocytes 1 %    NRBCs per 100 WBC 0 <1 /100    Absolute Neutrophils 4.0 1.6 - 8.3 10e3/uL    Absolute Lymphocytes 1.9 0.8 - 5.3 10e3/uL    Absolute Monocytes 0.5 0.0 - 1.3 10e3/uL    Absolute Eosinophils 0.1 0.0 - 0.7 10e3/uL    Absolute Basophils 0.1 0.0 - 0.2 10e3/uL    Absolute Immature Granulocytes 0.0 <=0.4 10e3/uL    Absolute NRBCs 0.0 10e3/uL   Hemoglobin A1c   Result Value Ref Range    Hemoglobin A1C 5.1 <5.7 %   XR Chest 2 Views    Narrative    CHEST TWO VIEWS 7/25/2024 9:59 AM     HISTORY: cp    COMPARISON: None.       Impression    IMPRESSION: No focal consolidation, pleural effusion or  pneumothorax.  Cardiomediastinal silhouette is unremarkable.    JOSÉ CERNA MD         SYSTEM ID:  [id number]   Troponin T, High Sensitivity   Result Value Ref Range    Troponin T, High Sensitivity 17 <=22 ng/L     EKG results: Normal sinus rhythm with no acute ST-T wave abnormalities.    Clinically Significant Risk Factors Present on Admission

## 2024-07-26 ENCOUNTER — APPOINTMENT (OUTPATIENT)
Dept: PHYSICAL THERAPY | Facility: CLINIC | Age: 59
End: 2024-07-26
Attending: STUDENT IN AN ORGANIZED HEALTH CARE EDUCATION/TRAINING PROGRAM
Payer: COMMERCIAL

## 2024-07-26 VITALS
HEART RATE: 70 BPM | RESPIRATION RATE: 16 BRPM | DIASTOLIC BLOOD PRESSURE: 97 MMHG | TEMPERATURE: 97.9 F | HEIGHT: 72 IN | SYSTOLIC BLOOD PRESSURE: 144 MMHG | WEIGHT: 181.88 LBS | OXYGEN SATURATION: 99 % | BODY MASS INDEX: 24.63 KG/M2

## 2024-07-26 LAB
ANION GAP SERPL CALCULATED.3IONS-SCNC: 8 MMOL/L (ref 7–15)
ATRIAL RATE - MUSE: 49 BPM
ATRIAL RATE - MUSE: 56 BPM
ATRIAL RATE - MUSE: 59 BPM
BUN SERPL-MCNC: 14.5 MG/DL (ref 6–20)
CALCIUM SERPL-MCNC: 9 MG/DL (ref 8.8–10.4)
CHLORIDE SERPL-SCNC: 107 MMOL/L (ref 98–107)
CHOLEST SERPL-MCNC: 181 MG/DL
CREAT SERPL-MCNC: 1.13 MG/DL (ref 0.67–1.17)
DIASTOLIC BLOOD PRESSURE - MUSE: NORMAL MMHG
EGFRCR SERPLBLD CKD-EPI 2021: 76 ML/MIN/1.73M2
ERYTHROCYTE [DISTWIDTH] IN BLOOD BY AUTOMATED COUNT: 12.6 % (ref 10–15)
FASTING STATUS PATIENT QL REPORTED: NO
GLUCOSE SERPL-MCNC: 92 MG/DL (ref 70–99)
HCO3 SERPL-SCNC: 22 MMOL/L (ref 22–29)
HCT VFR BLD AUTO: 45.2 % (ref 40–53)
HDLC SERPL-MCNC: 34 MG/DL
HGB BLD-MCNC: 15.8 G/DL (ref 13.3–17.7)
INTERPRETATION ECG - MUSE: NORMAL
LDLC SERPL CALC-MCNC: 126 MG/DL
MCH RBC QN AUTO: 32 PG (ref 26.5–33)
MCHC RBC AUTO-ENTMCNC: 35 G/DL (ref 31.5–36.5)
MCV RBC AUTO: 92 FL (ref 78–100)
NONHDLC SERPL-MCNC: 147 MG/DL
P AXIS - MUSE: 38 DEGREES
P AXIS - MUSE: 40 DEGREES
P AXIS - MUSE: 46 DEGREES
PLATELET # BLD AUTO: 264 10E3/UL (ref 150–450)
POTASSIUM SERPL-SCNC: 4 MMOL/L (ref 3.4–5.3)
PR INTERVAL - MUSE: 184 MS
PR INTERVAL - MUSE: 188 MS
PR INTERVAL - MUSE: 198 MS
QRS DURATION - MUSE: 92 MS
QT - MUSE: 418 MS
QT - MUSE: 422 MS
QT - MUSE: 454 MS
QTC - MUSE: 403 MS
QTC - MUSE: 410 MS
QTC - MUSE: 417 MS
R AXIS - MUSE: 35 DEGREES
R AXIS - MUSE: 36 DEGREES
R AXIS - MUSE: 46 DEGREES
RBC # BLD AUTO: 4.93 10E6/UL (ref 4.4–5.9)
SODIUM SERPL-SCNC: 137 MMOL/L (ref 135–145)
SYSTOLIC BLOOD PRESSURE - MUSE: NORMAL MMHG
T AXIS - MUSE: 48 DEGREES
T AXIS - MUSE: 53 DEGREES
T AXIS - MUSE: 54 DEGREES
TRIGL SERPL-MCNC: 104 MG/DL
VENTRICULAR RATE- MUSE: 49 BPM
VENTRICULAR RATE- MUSE: 56 BPM
VENTRICULAR RATE- MUSE: 59 BPM
WBC # BLD AUTO: 8.1 10E3/UL (ref 4–11)

## 2024-07-26 PROCEDURE — 82172 ASSAY OF APOLIPOPROTEIN: CPT | Performed by: INTERNAL MEDICINE

## 2024-07-26 PROCEDURE — 97161 PT EVAL LOW COMPLEX 20 MIN: CPT | Mod: GP | Performed by: PHYSICAL THERAPIST

## 2024-07-26 PROCEDURE — 97110 THERAPEUTIC EXERCISES: CPT | Mod: GP | Performed by: PHYSICAL THERAPIST

## 2024-07-26 PROCEDURE — G0378 HOSPITAL OBSERVATION PER HR: HCPCS

## 2024-07-26 PROCEDURE — 93010 ELECTROCARDIOGRAM REPORT: CPT | Performed by: INTERNAL MEDICINE

## 2024-07-26 PROCEDURE — 80048 BASIC METABOLIC PNL TOTAL CA: CPT | Performed by: NURSE PRACTITIONER

## 2024-07-26 PROCEDURE — 36415 COLL VENOUS BLD VENIPUNCTURE: CPT | Performed by: NURSE PRACTITIONER

## 2024-07-26 PROCEDURE — 250N000013 HC RX MED GY IP 250 OP 250 PS 637: Performed by: INTERNAL MEDICINE

## 2024-07-26 PROCEDURE — 99214 OFFICE O/P EST MOD 30 MIN: CPT | Performed by: INTERNAL MEDICINE

## 2024-07-26 PROCEDURE — 99239 HOSP IP/OBS DSCHRG MGMT >30: CPT | Performed by: PHYSICIAN ASSISTANT

## 2024-07-26 PROCEDURE — 85027 COMPLETE CBC AUTOMATED: CPT | Performed by: NURSE PRACTITIONER

## 2024-07-26 PROCEDURE — 250N000013 HC RX MED GY IP 250 OP 250 PS 637: Performed by: STUDENT IN AN ORGANIZED HEALTH CARE EDUCATION/TRAINING PROGRAM

## 2024-07-26 PROCEDURE — 93005 ELECTROCARDIOGRAM TRACING: CPT

## 2024-07-26 PROCEDURE — 97530 THERAPEUTIC ACTIVITIES: CPT | Mod: GP | Performed by: PHYSICAL THERAPIST

## 2024-07-26 RX ORDER — ROSUVASTATIN CALCIUM 20 MG/1
20 TABLET, COATED ORAL DAILY
Qty: 30 TABLET | Refills: 1 | Status: SHIPPED | OUTPATIENT
Start: 2024-07-27 | End: 2024-09-16

## 2024-07-26 RX ORDER — LISINOPRIL 5 MG/1
5 TABLET ORAL DAILY
Status: DISCONTINUED | OUTPATIENT
Start: 2024-07-26 | End: 2024-07-26 | Stop reason: HOSPADM

## 2024-07-26 RX ORDER — NITROGLYCERIN 0.4 MG/1
TABLET SUBLINGUAL
Qty: 30 TABLET | Refills: 0 | Status: SHIPPED | OUTPATIENT
Start: 2024-07-26

## 2024-07-26 RX ORDER — LISINOPRIL 5 MG/1
5 TABLET ORAL DAILY
Qty: 30 TABLET | Refills: 1 | Status: SHIPPED | OUTPATIENT
Start: 2024-07-27 | End: 2024-09-16

## 2024-07-26 RX ADMIN — LISINOPRIL 5 MG: 5 TABLET ORAL at 10:19

## 2024-07-26 RX ADMIN — ROSUVASTATIN CALCIUM 20 MG: 20 TABLET, FILM COATED ORAL at 08:25

## 2024-07-26 RX ADMIN — ASPIRIN 81 MG: 81 TABLET, COATED ORAL at 08:25

## 2024-07-26 RX ADMIN — TICAGRELOR 90 MG: 90 TABLET ORAL at 06:42

## 2024-07-26 ASSESSMENT — ACTIVITIES OF DAILY LIVING (ADL)
ADLS_ACUITY_SCORE: 35

## 2024-07-26 NOTE — PROGRESS NOTES
Cardiology Progress Note          Assessment and Plan:         58-year-old gentleman with a past medical history significant for dyslipidemia and a strong family history of coronary artery disease who presented to [facility] with unstable angina.    He is status post successful drug-eluting stent placement x 1 to a 99% stenosis involving the mid LAD on 2024..      IMPRESSION:     Unstable angina status post successful XANDER to mid LAD on 2024.  Dyslipidemia.  Elevated blood pressure without a formal diagnosis of hypertension.  Strong family history of coronary disease.    PLAN    -Doing well with cardiac rehab with no evidence of recurrent angina.  -Echocardiogram this hospitalization demonstrated normal biventricular systolic function with no significant valvular disease.  -On appropriate medical therapy with DAPT and rosuvastatin, will add lisinopril given elevated blood pressures.  -Okay to discharge from my standpoint, will order follow-up with our [facility] clinic in 1 to 2 months.        Interval History:     Seen post rehab.  Doing well without chest discomfort or dyspnea.             Review of Systems:   As per subjective, otherwise 5 systems reviewed and negative.           Physical Exam:   Blood pressure (!) 136/91, pulse 57, temperature 97.9  F (36.6  C), temperature source Oral, resp. rate 16, height 1.829 m (6'), weight 82.5 kg (181 lb 14.1 oz), SpO2 97%.      Vital Sign Ranges  Temperature Temp  Av.6  F (36.4  C)  Min: 97.2  F (36.2  C)  Max: 97.9  F (36.6  C)   Blood pressure Systolic (24hrs), Av , Min:116 , Max:164        Diastolic (24hrs), Av, Min:82, Max:103      Pulse Pulse  Av  Min: 47  Max: 76   Respirations Resp  Av.1  Min: 12  Max: 16   Pulse oximetry SpO2  Av.4 %  Min: 95 %  Max: 100 %       No intake or output data in the 24 hours ending 24 1014    Constitutional: NAD  Skin: Warm and dry  Neck: No JVD  Lungs: CTA  Cardiovascular: RRR, no  m/r/g  Abdomen: Soft, non tender.  Extremities and Back: No LE edema  Neurological: Nonfocal           Medications:     I have reviewed this patient's current medications.              Data:     Labs reviewed.           Miguel Escobar MD, M.D.

## 2024-07-26 NOTE — PROGRESS NOTES
Observation goals  PRIOR TO DISCHARGE        Comments:   -diagnostic tests and consults completed and resulted  NO  -vital signs normal or at patient baseline  NO  -tolerating oral intake to maintain hydration  Yes  -adequate pain control on oral analgesics  Yes  Nurse to notify provider when observation goals have been met and patient is ready for discharge.

## 2024-07-26 NOTE — PROGRESS NOTES
Observation goals  PRIOR TO DISCHARGE          -diagnostic tests and consults completed and resulted not met  -vital signs normal or at patient baseline met  -tolerating oral intake to maintain hydration met  -adequate pain control on oral analgesics met    Nurse to notify provider when observation goals have been met and patient is ready for discharge.

## 2024-07-26 NOTE — PROGRESS NOTES
07/26/24 0940   Appointment Info   Signing Clinician's Name / Credentials (PT) Lana Lambert, TROY   Living Environment   People in Home spouse   Current Living Arrangements house   Home Accessibility stairs to enter home;stairs within home   Number of Stairs, Main Entrance 6   Stair Railings, Main Entrance railings safe and in good condition   Number of Stairs, Within Home, Primary greater than 10 stairs  (Steps to basement and to upstairs.)   Stair Railings, Within Home, Primary railings safe and in good condition   Transportation Anticipated car, drives self   Living Environment Comments Patient was I with all ADL's and IADL's prior to admission.   Self-Care   Usual Activity Tolerance excellent   Regular Exercise Yes   Activity/Exercise Type walking;strength training   Exercise Amount/Frequency daily   Equipment Currently Used at Home none   Fall history within last six months no   General Information   Onset of Illness/Injury or Date of Surgery 07/25/24   Referring Physician Ashley Wang MD   Patient/Family Therapy Goals Statement (PT) To go home today.   Pertinent History of Current Problem (include personal factors and/or comorbidities that impact the POC) 58-year-old gentleman with a past medical history significant for dyslipidemia and a strong family history of coronary artery disease who presented to [facility] with a 3 to 4-day history of exertional chest discomfort that gradually worsened and intensity and duration. Had stent placed on 7/25.   Existing Precautions/Restrictions no known precautions/restrictions   Heart Disease Risk Factors High blood pressure   Cognition   Affect/Mental Status (Cognition) WNL   Orientation Status (Cognition) oriented x 4   Pain Assessment   Patient Currently in Pain Yes, see Vital Sign flowsheet  (Has low back pain from recent injury.)   Posture    Posture Forward head position;Protracted shoulders   Range of Motion (ROM)   Range of Motion ROM is WNL    Strength (Manual Muscle Testing)   Strength (Manual Muscle Testing) strength is WNL   Bed Mobility   Comment, (Bed Mobility) Independent   Transfers   Comment, (Transfers) Independent   Gait/Stairs (Locomotion)   Comment, (Gait/Stairs) Independent   Balance   Balance no deficits were identified   Sensory Examination   Sensory Perception WNL   Clinical Impression   Criteria for Skilled Therapeutic Intervention Yes, treatment indicated   PT Diagnosis (PT) Decreased functional endurance from baseline.   Influenced by the following impairments Decreased functional endurance from baseline   Functional limitations due to impairments Decreased functional endurance from baseline   Clinical Presentation (PT Evaluation Complexity) stable   Clinical Decision Making (Complexity) low complexity   Planned Therapy Interventions (PT) progressive activity/exercise;home program guidelines;risk factor education   Risk & Benefits of therapy have been explained evaluation/treatment results reviewed;care plan/treatment goals reviewed;risks/benefits reviewed;current/potential barriers reviewed;participants voiced agreement with care plan;participants included;patient   PT Total Evaluation Time   PT Eval, Low Complexity Minutes (03737) 10   Physical Therapy Goals   PT Frequency One time eval and treatment only   Interventions   Interventions Quick Adds Cardiac Rehab;Therapeutic Activity;Therapeutic Procedure   Therapeutic Procedure/Exercise   Treatment Time Includes (CR Only) See specific exercise details intervention group(s);Monitoring of vital signs (see vital signs flowsheet for details)   Therapeutic Activity   Therapeutic Activities: dynamic activities to improve functional performance Minutes (48239) 10   Symptoms Noted During/After Treatment None   Treatment Detail/Skilled Intervention Provided with and reviewed stent packet.   Ambulation   Workload Level surface   Duration (minutes) 10 minutes   Symptoms Denies symptoms  (Does  have low back pain due to unrelated injury.)   Cardiovascular Response Hypertensive response to exercise;Hypertension at rest   Exercise Details Gait on level surface at moderate pace for 10 min continuous.   Vital Signs Details See VSFS   Cardiac Education   Education Provided Home exercise program;OMNI Scale;Outpatient Cardiac Rehab;Resuming home activities;Risk factors   Education Packet Given to Patient Yes   All Patient Education Handouts Reviewed with Patient and/or Family Yes   Cardiac Rehab Phase II Plan   Phase II Order Received Yes   Phase II Appointment Status Scheduled   Date/Time 8/8 at 9:30   [facility]   PT Discharge Planning   PT Plan DC   PT Discharge Recommendation (DC Rec) home with outpatient cardiac rehab   PT Rationale for DC Rec Patient was able to demonstrate independence with all functional mobility and able to tolerate 10 min of continuous amb without symptoms.   PT Brief overview of current status Independent   Total Session Time   Timed Code Treatment Minutes 10   Total Session Time (sum of timed and untimed services) 20

## 2024-07-26 NOTE — PLAN OF CARE
Goal Outcome Evaluation:      Plan of Care Reviewed With: patient    Discharge instructions discussed and questions answered. Discharge medications sent with patient.

## 2024-07-26 NOTE — PROVIDER NOTIFICATION
MD Notification    Notified Person: MD     Notified Person Name:Misael Méndez    Notification Date/Time: 7/25 2210    Notification Interaction: Telephone readback    Purpose of Notification: Heparin orders still in MAR and unclear    Orders Received:Discontinue heparin 25,000 units in 0.45 NaCL 250 mL    Comments:

## 2024-07-26 NOTE — DISCHARGE SUMMARY
[facility]  Hospitalist Discharge Summary      Date of Admission:  7/25/2024  Date of Discharge:  7/26/2024  Discharging Provider: Flakita Fountain PA-C  Discharge Service: Hospitalist Service    Discharge Diagnoses   Unstable angina  Coronary artery disease s/p XANDER to LAD  HTN  HLD    Clinically Significant Risk Factors          Follow-ups Needed After Discharge   Follow-up Appointments     Follow-up and recommended labs and tests       Follow up with Cardiology for hospital follow up. They will call to   schedule.     Follow up with primary care provider in 7-10 days. Recheck BMP after   starting lisinopril. Recheck blood pressure            Unresulted Labs Ordered in the Past 30 Days of this Admission       Date and Time Order Name Status Description    7/26/2024  7:47 AM Apolipoprotein B In process         These results will be followed up by Cardiology     Discharge Disposition   Discharged to home  Condition at discharge: Stable    Hospital Course   Jose Munson is a very pleasant and active 58 year old male with hypertension, hyperlipidemia, and stage IIIa chronic kidney disease not previously being treated who presented to the emergency department with chest pain and symptoms concerning for unstable angina.  The hospitalist service is asked to admit for further treatment and evaluation.     Unstable angina  Coronary artery disease s/p XANDER to mLAD   Presents with 4 days of exertional chest pain.  Patient is very active typically goes for a 4 to 5 mile walk on a daily basis.  Noting on Monday he was having sternal chest pain.  Tuesday on his walk again having substernal chest pain and reporting some accompanied diaphoresis.  Day of admission patient reports minimal exertion shortly there after having a banding tightening of chest pain with significant diaphoresis presenting to emergency department.  -Workup showed EKG revealing normal sinus rhythm and initial troponins at 17,  flat   -Patient does have significant family history of cardiovascular disease to include his brother that had a recent myocardial infarction with stent placement as well as his grandfather and perhaps his father as well.  *ECHO with EF 60-65% with normal wall motion  *cardiac catheterization 7/25 showed mLAD lesion 99% stenosis, otherwise 50% distal LAD and mild disease Cx and RCA  --DAPT x 1 year  --BB not initiated due to baseline bradycardia  --started on Crestor 20mg     Hypertension  -Review of records shows that over the past year and 1/2 to 2 years patient has had fairly consistent diastolic blood pressures 90s to 100s.   BP has been elevated during admission with diastolics   --start lisinopril 5mg daily, will need BMP in 1-2 weeks      Hyperlipidemia  HLD 34, , trigs 104  --start high intensity statin      Elevated Cr  Cr mildly elevated 1.2 on admission. Baseline seems 1.1-1.2. GFR is 70  --management HTN as above  --follow up PCP      Mild hyperglycemia  A1C is 5.1       Consultations This Hospital Stay   PHARMACY IP CONSULT  CARDIOLOGY IP CONSULT  PHARMACY IP CONSULT  HOSPITALIST IP CONSULT  NUTRITION SERVICES ADULT IP CONSULT  CARDIAC REHAB IP CONSULT  SMOKING CESSATION PROGRAM IP CONSULT    Code Status   Full Code    Time Spent on this Encounter   I, Flakita Fountain PA-C, personally saw the patient today and spent greater than 30 minutes discharging this patient.       Flakita Fountain PA-C  [facility]TEND[facility]AY  [address] [address]  Phone: [phone]  ______________________________________________________________________    Physical Exam   Temp: 97.9  F (36.6  C) Temp src: Oral BP: (!) 144/97 Pulse: 70   Resp: 16 SpO2: 99 % O2 Device: None (Room air)    Vitals:    07/25/24 0925 07/25/24 1158   Weight: 81.6 kg (180 lb) 82.5 kg (181 lb 14.1 oz)     Vital Signs with Ranges  Temp:  [97.2  F (36.2  C)-97.9  F (36.6   C)] 97.9  F (36.6  C)  Pulse:  [47-76] 70  Resp:  [12-16] 16  BP: (116-164)/() 144/97  SpO2:  [97 %-100 %] 99 %  No intake/output data recorded.    Constitutional: Alert and oriented, walking around room. Appears comfortable and is pleasantly conversant   ENT: moist mucous membranes  Respiratory: Lungs clear to auscultation bilaterally, no increased work of breathing  Cardiovascular: Regular rate and rhythm, no murmur, no edema  GI: positive bowel sounds. Abdomen is soft   MSK:  moves all four extremities. Normal tone  Neuro:  grossly intact          Primary Care Physician   Alyssa Briscoe, DO    Discharge Orders      Follow-Up with Cardiology RIKKI      Cardiac Rehab  Referral      Follow-Up with Cardiology RIKKI      Brief Discharge Instructions    Do NOT stop your aspirin or platelet inhibitor unless directed by your Cardiologist.  These medications help to prevent platelets in your blood from sticking together and forming a clot.  Examples of these medications are:  Ticagrelor (Brilinta), Clopidigrel (Plavix), Prasugrel (Effient)     When to call - Contact the Heart Clinic    You may experience symptoms that require follow-up before your scheduled appointment. Contact the Heart Clinic if you develop: Fever over 100.4o Fahrenheit, that lasts more than one day; Redness, heat, or pus at the puncture site; Change in color or temperature in your groin or leg.     When to call - Reasons to Call 911    If your groin starts to bleed or begins to swell suddenly after leaving the hospital, lie flat and apply firm pressure just above the puncture site for 15 minutes.  If bleeding continues, call 9-1-1.     Precautions - Lifting    NO lifting of more than 10 pounds for at least 3 days.  If you usually lift 50 pounds or more daily, talk with your Cardiologist.     Precautions - Household Activities    Avoid any hard work or tiring activities.  NO physical activity such as mowing the lawn, raking, vacuuming,  changing sheets on your bed, snow shoveling, or using a .     Precautions - Active Sports Activities    Avoid any tiring sports activities.  This includes, yard work, jogging, biking, bowling, swimming, tennis or golf, and sexual activity.     Precautions - Elective Dental Work    NO elective dental work for 6 weeks after receiving a stent.     Comfort and Pain Management - Pain after Surgery    Pain after surgery is normal and expected.  Your leg may be sore or stiff for a few days, and your pain will improve with time. You may take Tylenol or a pain medicine recommended by your Cardiologist.     Comfort and Pain Management - Bruising after Surgery    Bruising around the groin area is normal.  It may take 2-3 weeks for this to go away.  It is normal for the bruised area to turn green and/or yellow as it is healing.  A small lump may also be present and may last 2-3 months.     Activity - Daily Walking    During the day get up and walk around every 2 hours.     Activity - Light Household Activities    Light household activities are ok.     Activity - Elevate Legs    Elevate legs in between all activities.     Activity - Cardiac Rehab    You are encouraged to enroll in an Outpatient Cardiac Rehab program after discharge from the hospital.  Our Cardiac Rehab staff may visit briefly with you while you're in the hospital.  If they miss you, someone will contact you after you are home.     Return to Driving    Driving is NOT permitted for 24 hours after surgery     Return to work    You may return to work after 72 hours if you are feeling well and your job does not involve heavy lifting.     Dressing Removal    You may take off the dressing on your groin the day after your procedure.     Incision Care    Keep the incision area dry and clean.  You do not need to use a bandage on your incision.     Shower / Bathing    It is ok to shower with regular soap. Pat dry, do not rub. No tub bath for 3 days. No swimming  in a pool or hot tub immersion for 1 week     Reason Lipid Lowering Medications not prescribed from this order set     Reason for your hospital stay    You were here for evaluation of chest pain due to a blockage in one of your coronary arteries     Follow-up and recommended labs and tests     Follow up with Cardiology for hospital follow up. They will call to schedule.     Follow up with primary care provider in 7-10 days. Recheck BMP after starting lisinopril. Recheck blood pressure     When to contact your care team    If you notice persistent shortness of breath after taking Brilinta please reach out to Cardiology to discuss.   [facility] [place] [phone]    Take blood pressure at home once every day or every other day (after meds and while resting) and keep a log for PCP/Cardiology     Diet    Follow this diet upon discharge: Orders Placed This Encounter      Low Saturated Fat Na <2400 mg       Significant Results and Procedures   Most Recent 3 CBC's:  Recent Labs   Lab Test 07/26/24  0652 07/25/24  0939   WBC 8.1 6.6   HGB 15.8 16.0   MCV 92 92    281     Most Recent 3 BMP's:  Recent Labs   Lab Test 07/26/24  0652 07/25/24  0939    138   POTASSIUM 4.0 4.0   CHLORIDE 107 105   CO2 22 24   BUN 14.5 17.3   CR 1.13 1.21*   ANIONGAP 8 9   ANGELA 9.0 9.2   GLC 92 102*     Most Recent 3 Troponin's:No lab results found.  Most Recent Cholesterol Panel:  Recent Labs   Lab Test 07/25/24 1952   CHOL 181   *   HDL 34*   TRIG 104     Most Recent Hemoglobin A1c:  Recent Labs   Lab Test 07/25/24  0939   A1C 5.1   ,   Results for orders placed or performed during the hospital encounter of 07/25/24   XR Chest 2 Views    Narrative    CHEST TWO VIEWS 7/25/2024 9:59 AM     HISTORY: cp    COMPARISON: None.       Impression    IMPRESSION: No focal consolidation, pleural effusion or pneumothorax.  Cardiomediastinal silhouette is unremarkable.    JOSÉ CERNA MD         SYSTEM ID:  [id number]    Echocardiogram Complete     Value    LVEF  60-65%    [facility]    [id number]  [id number]  [id number]  [id number]^DOMINICK^DARBY^JOHANA     [facility]  Echocardiography Laboratory  [address] [place] [address]     Name: YURI TAN  MRN: [id number]  : 1965  Study Date: 2024 12:23 PM  Age: 58 yrs  Gender: Male  Patient Location: [facility]  Reason For Study: Unstable Angina  Ordering Physician: DARBY TAN  Performed By: FLOR     BSA: 2.0 m2  Height: 72 in  Weight: 180 lb  HR: 53  BP: 126/91 mmHg  ______________________________________________________________________________  Procedure  Complete Portable Echo Adult. Optison (NDC #5023-4221) given intravenously.  Contrast Optison.  ______________________________________________________________________________  Interpretation Summary     Left ventricular systolic function is normal.  The visual ejection fraction is 60-65%.  Normal left ventricular wall motion  The right ventricle is normal in structure, function and size.  No significant valve dysfunction.  The inferior vena cava was normal in size with preserved respiratory  variability.  There is no pericardial effusion.     No prior study for comparison.  ______________________________________________________________________________  Left Ventricle  The left ventricle is normal in structure, function and size. Left ventricular  systolic function is normal. The visual ejection fraction is 60-65%. Left  ventricular diastolic function is indeterminate. Normal left ventricular wall  motion.     Right Ventricle  The right ventricle is normal in structure, function and size.     Atria  Normal left atrial size. Right atrial size is normal.     Mitral Valve  The mitral valve is normal in structure and function.     Tricuspid Valve  The tricuspid valve is normal in structure and function. Right ventricular  systolic pressure could not be approximated due to inadequate  tricuspid  regurgitation.     Aortic Valve  The aortic valve is normal in structure and function.     Pulmonic Valve  The pulmonic valve is normal in structure and function.     Vessels  The aortic root is normal size. Normal size ascending aorta. The inferior vena  cava was normal in size with preserved respiratory variability.     Pericardium  There is no pericardial effusion.     ______________________________________________________________________________  MMode/2D Measurements & Calculations  IVSd: 0.90 cm  LVIDd: 4.7 cm  LVIDs: 3.2 cm  LVPWd: 1.0 cm  FS: 31.8 %     LV mass(C)d: 153.4 grams  LV mass(C)dI: 75.3 grams/m2  Ao root diam: 3.8 cm  asc Aorta Diam: 3.8 cm  LVOT diam: 2.4 cm  LVOT area: 4.4 cm2  Ao root diam index Ht(cm/m): 2.1  Ao root diam index BSA (cm/m2): 1.9  Asc Ao diam index BSA (cm/m2): 1.9  Asc Ao diam index Ht(cm/m): 2.1  LA Volume (BP): 47.3 ml  LA Volume Index (BP): 23.2 ml/m2     RV Base: 3.5 cm  RWT: 0.43  TAPSE: 2.0 cm     Doppler Measurements & Calculations  MV E max nathan: 56.1 cm/sec  MV A max nathan: 60.8 cm/sec  MV E/A: 0.92  MV dec slope: 171.2 cm/sec2  MV dec time: 0.33 sec  Ao V2 max: 115.5 cm/sec  Ao max P.0 mmHg  Ao V2 mean: 88.8 cm/sec  Ao mean PG: 3.3 mmHg  Ao V2 VTI: 28.5 cm  AMBERLY(I,D): 3.3 cm2  AMBERLY(V,D): 3.7 cm2  LV V1 max PG: 3.8 mmHg  LV V1 max: 97.5 cm/sec  LV V1 VTI: 21.3 cm  SV(LVOT): 94.8 ml  SI(LVOT): 46.5 ml/m2  PA acc time: 0.14 sec  AV Nathan Ratio (DI): 0.84  AMBERLY Index (cm2/m2): 1.6  E/E' av.5  Lateral E/e': 4.8  Medial E/e': 8.3     RV S Nathan: 12.9 cm/sec     ______________________________________________________________________________  Report approved by: Galen Car 2024 04:04 PM         Cardiac Catheterization    Narrative      Ost Cx to Prox Cx lesion is 20% stenosed.    Mid LAD lesion is 99% stenosed.    Dist LAD lesion is 50% stenosed.    Mid RCA lesion is 20% stenosed.    1.One-vessel CAD with culprit lesion in the mid LAD  2.Successful  PCI of mid LAD lesion with XANDER  3.Successful deployment of an Angio-Seal closure device to the right   common femoral artery         Discharge Medications   Current Discharge Medication List        START taking these medications    Details   aspirin 81 MG EC tablet Take 1 tablet (81 mg) by mouth daily Start tomorrow.  Qty: 30 tablet, Refills: 3    Associated Diagnoses: Unstable angina (H)      lisinopril (ZESTRIL) 5 MG tablet Take 1 tablet (5 mg) by mouth daily  Qty: 30 tablet, Refills: 1    Associated Diagnoses: Unstable angina (H)      nitroGLYcerin (NITROSTAT) 0.4 MG sublingual tablet For chest pain place 1 tablet under the tongue every 5 minutes for 3 doses. If symptoms persist 5 minutes after 1st dose call 911.  Qty: 30 tablet, Refills: 0    Associated Diagnoses: Unstable angina (H)      rosuvastatin (CRESTOR) 20 MG tablet Take 1 tablet (20 mg) by mouth daily  Qty: 30 tablet, Refills: 1    Associated Diagnoses: Unstable angina (H)      ticagrelor (BRILINTA) 90 MG tablet Take 1 tablet (90 mg) by mouth 2 times daily for 360 doses  Qty: 180 tablet, Refills: 1    Associated Diagnoses: Unstable angina (H)           CONTINUE these medications which have NOT CHANGED    Details   Ibuprofen (ADVIL PO) Take 2 tablets by mouth as needed for other (pain)           Allergies   Allergies   Allergen Reactions    Aleve [Naproxen] Hives

## 2024-07-26 NOTE — PROGRESS NOTES
Observation goals  PRIOR TO DISCHARGE        Comments:   -diagnostic tests and consults completed and resulted not met  -vital signs normal or at patient baseline met  -tolerating oral intake to maintain hydration met  -adequate pain control on oral analgesics met  Nurse to notify provider when observation goals have been met and patient is ready for discharge.

## 2024-07-26 NOTE — PLAN OF CARE
Goal Outcome Evaluation:    PRIMARY Concern: chest pain  SAFETY RISK Concerns (fall risk, behaviors, etc.): heparin, dizziness      Isolation/Type: none  Tests/Procedures for NEXT shift: Monitor R angioseal site  Consults? (Pending/following, signed-off?) cards following  Where is patient from? (Home, TCU, etc.): home  Other Important info for NEXT shift: None  Anticipated DC date & active delays: TBD  ____________________________________________________________________  SUMMARY NOTE:  Orientation/Cognitive: A/Ox4  Observation Goals (Met/ Not Met): not met  Mobility Level/Assist Equipment: Ind in room  Antibiotics & Plan (IV/po, length of tx left): none  Pain Management: denies  Tele/VS/O2: VSS RA x martin at times, Tele: Sinus Martin  ABNL Lab/BG: See chart  Diet: 2g Na, Low fat  Bowel/Bladder: continent  Skin Concerns: wdl  Drains/Devices: PIV SL  Patient Stated Goal for Today: Go home         Observation goals  PRIOR TO DISCHARGE        Comments:   -diagnostic tests and consults completed and resulted not met  -vital signs normal or at patient baseline met  -tolerating oral intake to maintain hydration met  -adequate pain control on oral analgesics met  Nurse to notify provider when observation goals have been met and patient is ready for discharge

## 2024-07-28 LAB — APO B100 SERPL-MCNC: 101 MG/DL

## 2024-07-30 ENCOUNTER — TELEPHONE (OUTPATIENT)
Dept: CARDIOLOGY | Facility: CLINIC | Age: 59
End: 2024-07-30
Payer: COMMERCIAL

## 2024-07-30 NOTE — TELEPHONE ENCOUNTER
Patient was admitted to [facility] on 7/25/24 with unstable angina.    PMH: HLD and a strong family history of coronary artery disease.    7/25/24: Echo demonstrated normal biventricular systolic function with no significant valvular disease.     7/25/24: Coronary angiogram via RFA resulted in:      Ost Cx to Prox Cx lesion is 20% stenosed.    Mid LAD lesion is 99% stenosed.    Dist LAD lesion is 50% stenosed.    Mid RCA lesion is 20% stenosed.     1.One-vessel CAD with culprit lesion in the mid LAD.  2.Successful PCI of mid LAD lesion with XANDER.  3.Successful deployment of an Angio-Seal closure device to the right common femoral artery.    Pt was started on ASA, Lisinopril, NTG, Crestor and Brilinta at time of discharge.    Pt is scheduled for an OV on 9/6/24 at 1450 with RIKKI Stacie Bernardo at our [place] Office.    Cardiac rehab is scheduled on 8/8/24 at 0915 in [place].    Writer attempted to call pt for a cardiology post discharge phone call, but no answer. VM left to return my call. VEGA Tavares RN.

## 2024-07-31 NOTE — TELEPHONE ENCOUNTER
Writer attempted to return pt's phone message, but now, no answer. VM left to return my call. VEGA Tavares RN.

## 2024-08-08 ENCOUNTER — HOSPITAL ENCOUNTER (OUTPATIENT)
Dept: CARDIAC REHAB | Facility: CLINIC | Age: 59
Discharge: HOME | End: 2024-08-08
Attending: STUDENT IN AN ORGANIZED HEALTH CARE EDUCATION/TRAINING PROGRAM
Payer: COMMERCIAL

## 2024-08-08 DIAGNOSIS — I20.0 UNSTABLE ANGINA (H): ICD-10-CM

## 2024-08-08 PROCEDURE — 93798 PHYS/QHP OP CAR RHAB W/ECG: CPT | Performed by: OCCUPATIONAL THERAPIST

## 2024-08-10 ENCOUNTER — HEALTH MAINTENANCE LETTER (OUTPATIENT)
Age: 59
End: 2024-08-10

## 2024-08-21 NOTE — TELEPHONE ENCOUNTER
Third attempt to contact pt.    Called patient to discuss any post hospital d/c questions, review medication changes, and confirm f/u appts. Patient denied any questions regarding new medications or changes to PTA medications.     RN confirmed with patient that he was d/c with an adequate supply of the antiplatelet Brilinta, and reminded of importance of taking without interruption.     Pt has an Rx for PRN SL Nitroglycerin.     Patient denied any SOB, chest pain. Occasional lightheadedness during activity. States daily BP's have been running 100-108/60-70's. Instructed to make position changes slowly and fluids encouraged. Will keep BP diary.    RFA cardiac cath site is without bleeding, swelling, redness or signs of infection.     RN confirmed with patient that he is scheduled for an OV on 9/6/24 at 1450 with CASSANDRA Stacie Bernardo at our [place] Office. Dr. Escobar's Team RN phone number provided.    Pt has opted out of cardiac rehab.    Patient advised to call clinic with any cardiac related questions or concerns prior to this cassandra't. Patient verbalized understanding and agreed with plan. VEGA Tavares RN.

## 2024-09-16 ENCOUNTER — OFFICE VISIT (OUTPATIENT)
Dept: CARDIOLOGY | Facility: CLINIC | Age: 59
End: 2024-09-16
Payer: COMMERCIAL

## 2024-09-16 VITALS
OXYGEN SATURATION: 99 % | SYSTOLIC BLOOD PRESSURE: 115 MMHG | DIASTOLIC BLOOD PRESSURE: 73 MMHG | HEART RATE: 60 BPM | HEIGHT: 72 IN | BODY MASS INDEX: 24.39 KG/M2 | WEIGHT: 180.1 LBS

## 2024-09-16 DIAGNOSIS — I20.0 UNSTABLE ANGINA (H): ICD-10-CM

## 2024-09-16 DIAGNOSIS — I25.10 CORONARY ARTERY DISEASE INVOLVING NATIVE CORONARY ARTERY OF NATIVE HEART WITHOUT ANGINA PECTORIS: Primary | ICD-10-CM

## 2024-09-16 PROCEDURE — 99214 OFFICE O/P EST MOD 30 MIN: CPT | Performed by: NURSE PRACTITIONER

## 2024-09-16 RX ORDER — ROSUVASTATIN CALCIUM 20 MG/1
20 TABLET, COATED ORAL DAILY
Qty: 90 TABLET | Refills: 3 | Status: SHIPPED | OUTPATIENT
Start: 2024-09-16

## 2024-09-16 RX ORDER — LISINOPRIL 2.5 MG/1
2.5 TABLET ORAL DAILY
Qty: 90 TABLET | Refills: 3 | Status: SHIPPED | OUTPATIENT
Start: 2024-09-16

## 2024-09-16 NOTE — PROGRESS NOTES
Cardiology Clinic Progress Note  Jose Munson MRN# [id number]   YOB: 1965 Age: 58 year old   Primary Cardiologist: Dr. Escobar  Reason for visit: Post hospital follow up             Assessment and Plan:     1.  Coronary artery disease, s/p XANDER x 1 to LAD  -Coronary angiogram: Ostial circumflex, mid RCA, 20% stenosed, distal LAD with 50% stenosis, and culprit lesion a mid LAD 99% stenosis  -TTE with preserved LV function no wall motion abnormalities    2.  Hyperlipidemia  -Initiated on rosuvastatin 20 mg daily during July hospitalization  -Normal apolipoprotein B    3.  Hypertension, with hypotension    4.  CKD stage III  -Creatinine 1.1-1.2  -Stable following initiation of lisinopril      Plan:  Continue DAPT with aspirin and Brilinta  Continue simvastatin 20 mg daily  Joe FLP/ALT in 1 month  Reduce lisinopril to 2.5 mg daily, patient to let us know if this helps with his postexercise hypotension  Offered switch from Brilinta to Plavix if shortness of breath is persisting, patient declined    Follow up: Recommend he establish care in [place] as he is moving there in November, otherwise can follow up with Dr. Escobar in 6 months if his plans change        History of Presenting Illness:    Jose Munson is a very pleasant 58 year old male with a history of mild dyslipidemia, coronary artery disease, strong family history of coronary artery disease including his grandfather passing from a fatal MI and sister with medically manage CAD.    Patient was hospitalized in late July after presenting with 3 to 4 days of exertional chest discomfort.  She was consulted and recommended coronary angiography.  This revealed a 99% mid LAD lesion which was treated with XANDER x 1.  He had residual stenosis in the ostial proximal circumflex and mid RCA with 20% stenosis respectively, and a distal LAD lesion with 50% stenosis.  Echocardiogram showed preserved LV function with an ejection fraction of 60 to 65%, no regional  wall motion abnormalities, normal aortic dimensions, no significant valvular disease.  He was discharged with aspirin, Brilinta, rosuvastatin, and lisinopril.  He was not started on a beta-blocker due to his resting bradycardia.    Patient is here today for a post hospital follow up.  He went to 1 session of cardiac rehab and then was discharged.  Since then he has been exercising daily, walking 4 to 5 miles outdoors without any concerning symptoms.  He notes he does get tired in the afternoons, sometimes taking a nap.  He does watch his blood pressures and notes that it does get down into the 90s following exercise.    Patient denies chest pain or chest tightness. Denies dizziness, lightheadedness or other presyncopal symptoms. Denies tachycardia or palpitations.  Denies orthopnea or PND.  He does have a rare occasion where he lays on his side and feels mildly short of breath, which is fleeting.  He did not have any issues with his right femoral arterial site following his hospital stay.    Most recent labs from 7/25/2024 show cholesterol 91, HDL 34, , triglycerides 104, sodium 137, potassium 4.0, BUN 45, creatinine 1.13, GFR 76, hemoglobin 15.8, platelets 264, repeat BMP done 8/5/2024 reviewed in Care Everywhere showed sodium 141, potassium 4.5, BUN 14, creatinine 1.09, GFR 71.     Blood pressure 115/73 and HR 60 in clinic today.  He has been compliant with his medications.  Denies any significant bleeding issues with dual antiplatelet therapy, just easily bruising.        Recent Hospitalizations   As above          Social History      Social History     Socioeconomic History    Marital status:      Spouse name: Not on file    Number of children: Not on file    Years of education: Not on file    Highest education level: Not on file   Occupational History    Not on file   Tobacco Use    Smoking status: Never     Passive exposure: Never    Smokeless tobacco: Never   Substance and Sexual Activity     "Alcohol use: Yes     Comment: socially    Drug use: Not on file    Sexual activity: Not on file   Other Topics Concern    Not on file   Social History Narrative    Not on file     Social Determinants of Health     Financial Resource Strain: Not on file   Food Insecurity: Not on file   Transportation Needs: Not on file   Physical Activity: Not on file   Stress: Not on file   Social Connections: Unknown (7/6/2023)    [facility]    Social Connections     Frequency of Communication with Friends and Family: Not on file   Interpersonal Safety: Not on file   Housing Stability: Not on file            Review of Systems:   Skin:  not assessed     Eyes:  not assessed    ENT:  not assessed    Respiratory:  Negative    Cardiovascular:    Positive for;fatigue  Gastroenterology: not assessed    Genitourinary:  not assessed    Musculoskeletal:  not assessed    Neurologic:  not assessed    Psychiatric:  not assessed    Heme/Lymph/Imm:  not assessed    Endocrine:  not assessed           Physical Exam:   Vitals: /73   Pulse 60   Ht 1.829 m (6')   Wt 81.7 kg (180 lb 1.6 oz)   SpO2 99%   BMI 24.43 kg/m     Wt Readings from Last 4 Encounters:   09/16/24 81.7 kg (180 lb 1.6 oz)   07/25/24 82.5 kg (181 lb 14.1 oz)     GEN: well nourished, in no acute distress.  HEENT:  Pupils equal, round. Sclerae nonicteric.   NECK: Supple, no masses appreciated.   C/V:  Regular rate and rhythm, no murmur, rub or gallop.    RESP: Respirations are unlabored. Clear to auscultation bilaterally without wheezing, rales, or rhonchi.  GI: Abdomen soft, nontender.  EXTREM: No LE edema.  NEURO: Alert and oriented, cooperative.  SKIN: Warm and dry.        Data:     LIPID RESULTS:  Lab Results   Component Value Date    CHOL 181 07/25/2024    HDL 34 (L) 07/25/2024     (H) 07/25/2024    TRIG 104 07/25/2024     LIVER ENZYME RESULTS:  No results found for: \"AST\", \"ALT\"  CBC RESULTS:  Lab Results " "  Component Value Date    WBC 8.1 07/26/2024    RBC 4.93 07/26/2024    HGB 15.8 07/26/2024    HCT 45.2 07/26/2024    MCV 92 07/26/2024    MCH 32.0 07/26/2024    MCHC 35.0 07/26/2024    RDW 12.6 07/26/2024     07/26/2024     BMP RESULTS:  Lab Results   Component Value Date     07/26/2024    POTASSIUM 4.0 07/26/2024    CHLORIDE 107 07/26/2024    CO2 22 07/26/2024    ANIONGAP 8 07/26/2024    GLC 92 07/26/2024    BUN 14.5 07/26/2024    CR 1.13 07/26/2024    GFRESTIMATED 76 07/26/2024    ANGELA 9.0 07/26/2024      A1C RESULTS:  Lab Results   Component Value Date    A1C 5.1 07/25/2024     INR RESULTS:  No results found for: \"INR\"         Medications     Current Outpatient Medications   Medication Sig Dispense Refill    aspirin 81 MG EC tablet Take 1 tablet (81 mg) by mouth daily Start tomorrow. 30 tablet 3    Ibuprofen (ADVIL PO) Take 2 tablets by mouth as needed for other (pain)      lisinopril (ZESTRIL) 2.5 MG tablet Take 1 tablet (2.5 mg) by mouth daily. 90 tablet 3    nitroGLYcerin (NITROSTAT) 0.4 MG sublingual tablet For chest pain place 1 tablet under the tongue every 5 minutes for 3 doses. If symptoms persist 5 minutes after 1st dose call 911. 30 tablet 0    rosuvastatin (CRESTOR) 20 MG tablet Take 1 tablet (20 mg) by mouth daily. 90 tablet 3    ticagrelor (BRILINTA) 90 MG tablet Take 1 tablet (90 mg) by mouth 2 times daily. 180 tablet 2          Past Medical History   History reviewed. No pertinent past medical history.  Past Surgical History:   Procedure Laterality Date    CV CORONARY ANGIOGRAM N/A 7/25/2024    Procedure: Coronary Angiogram;  Surgeon: Tanner Barnett MD;  Location: [facility] CARDIAC CATH LAB    CV PCI N/A 7/25/2024    Procedure: Percutaneous Coronary Intervention;  Surgeon: Tanner Barnett MD;  Location: [facility] CARDIAC CATH LAB     History reviewed. No pertinent family history.         Allergies   Aleve [naproxen]        Elaine Bernardo NP  [facility] " HEART [facility]  Pager: [phone]

## 2024-09-16 NOTE — LETTER
9/16/2024    Alyssa Briscoe, DO, DO  [address] [place] [address]    RE: Jose Munson       Dear Colleague,     I had the pleasure of seeing Jose Munson in the [facility].    Cardiology Clinic Progress Note  Jose Munson MRN# [id number]   YOB: 1965 Age: 58 year old   Primary Cardiologist: Dr. Escobar  Reason for visit: Post hospital follow up             Assessment and Plan:     1.  Coronary artery disease, s/p XANDER x 1 to LAD  -Coronary angiogram: Ostial circumflex, mid RCA, 20% stenosed, distal LAD with 50% stenosis, and culprit lesion a mid LAD 99% stenosis  -TTE with preserved LV function no wall motion abnormalities    2.  Hyperlipidemia  -Initiated on rosuvastatin 20 mg daily during July hospitalization  -Normal apolipoprotein B    3.  Hypertension, with hypotension    4.  CKD stage III  -Creatinine 1.1-1.2  -Stable following initiation of lisinopril      Plan:  Continue DAPT with aspirin and Brilinta  Continue simvastatin 20 mg daily  Joe FLP/ALT in 1 month  Reduce lisinopril to 2.5 mg daily, patient to let us know if this helps with his postexercise hypotension  Offered switch from Brilinta to Plavix if shortness of breath is persisting, patient declined    Follow up: Recommend he establish care in [place] as he is moving there in November, otherwise can follow up with Dr. Escobar in 6 months if his plans change        History of Presenting Illness:    Jose Munson is a very pleasant 58 year old male with a history of mild dyslipidemia, coronary artery disease, strong family history of coronary artery disease including his grandfather passing from a fatal MI and sister with medically manage CAD.    Patient was hospitalized in late July after presenting with 3 to 4 days of exertional chest discomfort.  She was consulted and recommended coronary angiography.  This revealed a 99% mid LAD lesion which was treated with XANDER x 1.  He had residual stenosis in the ostial  proximal circumflex and mid RCA with 20% stenosis respectively, and a distal LAD lesion with 50% stenosis.  Echocardiogram showed preserved LV function with an ejection fraction of 60 to 65%, no regional wall motion abnormalities, normal aortic dimensions, no significant valvular disease.  He was discharged with aspirin, Brilinta, rosuvastatin, and lisinopril.  He was not started on a beta-blocker due to his resting bradycardia.    Patient is here today for a post hospital follow up.  He went to 1 session of cardiac rehab and then was discharged.  Since then he has been exercising daily, walking 4 to 5 miles outdoors without any concerning symptoms.  He notes he does get tired in the afternoons, sometimes taking a nap.  He does watch his blood pressures and notes that it does get down into the 90s following exercise.    Patient denies chest pain or chest tightness. Denies dizziness, lightheadedness or other presyncopal symptoms. Denies tachycardia or palpitations.  Denies orthopnea or PND.  He does have a rare occasion where he lays on his side and feels mildly short of breath, which is fleeting.  He did not have any issues with his right femoral arterial site following his hospital stay.    Most recent labs from 7/25/2024 show cholesterol 91, HDL 34, , triglycerides 104, sodium 137, potassium 4.0, BUN 45, creatinine 1.13, GFR 76, hemoglobin 15.8, platelets 264, repeat BMP done 8/5/2024 reviewed in Care Everywhere showed sodium 141, potassium 4.5, BUN 14, creatinine 1.09, GFR 71.     Blood pressure 115/73 and HR 60 in clinic today.  He has been compliant with his medications.  Denies any significant bleeding issues with dual antiplatelet therapy, just easily bruising.        Recent Hospitalizations   As above          Social History      Social History     Socioeconomic History     Marital status:      Spouse name: Not on file     Number of children: Not on file     Years of education: Not on file      Highest education level: Not on file   Occupational History     Not on file   Tobacco Use     Smoking status: Never     Passive exposure: Never     Smokeless tobacco: Never   Substance and Sexual Activity     Alcohol use: Yes     Comment: socially     Drug use: Not on file     Sexual activity: Not on file   Other Topics Concern     Not on file   Social History Narrative     Not on file     Social Determinants of Health     Financial Resource Strain: Not on file   Food Insecurity: Not on file   Transportation Needs: Not on file   Physical Activity: Not on file   Stress: Not on file   Social Connections: Unknown (7/6/2023)    Received from [facility]    Social Connections      Frequency of Communication with Friends and Family: Not on file   Interpersonal Safety: Not on file   Housing Stability: Not on file            Review of Systems:   Skin:  not assessed     Eyes:  not assessed    ENT:  not assessed    Respiratory:  Negative    Cardiovascular:    Positive for;fatigue  Gastroenterology: not assessed    Genitourinary:  not assessed    Musculoskeletal:  not assessed    Neurologic:  not assessed    Psychiatric:  not assessed    Heme/Lymph/Imm:  not assessed    Endocrine:  not assessed           Physical Exam:   Vitals: /73   Pulse 60   Ht 1.829 m (6')   Wt 81.7 kg (180 lb 1.6 oz)   SpO2 99%   BMI 24.43 kg/m     Wt Readings from Last 4 Encounters:   09/16/24 81.7 kg (180 lb 1.6 oz)   07/25/24 82.5 kg (181 lb 14.1 oz)     GEN: well nourished, in no acute distress.  HEENT:  Pupils equal, round. Sclerae nonicteric.   NECK: Supple, no masses appreciated.   C/V:  Regular rate and rhythm, no murmur, rub or gallop.    RESP: Respirations are unlabored. Clear to auscultation bilaterally without wheezing, rales, or rhonchi.  GI: Abdomen soft, nontender.  EXTREM: No LE edema.  NEURO: Alert and oriented, cooperative.  SKIN: Warm and dry.        Data:     LIPID RESULTS:  Lab Results  "  Component Value Date    CHOL 181 07/25/2024    HDL 34 (L) 07/25/2024     (H) 07/25/2024    TRIG 104 07/25/2024     LIVER ENZYME RESULTS:  No results found for: \"AST\", \"ALT\"  CBC RESULTS:  Lab Results   Component Value Date    WBC 8.1 07/26/2024    RBC 4.93 07/26/2024    HGB 15.8 07/26/2024    HCT 45.2 07/26/2024    MCV 92 07/26/2024    MCH 32.0 07/26/2024    MCHC 35.0 07/26/2024    RDW 12.6 07/26/2024     07/26/2024     BMP RESULTS:  Lab Results   Component Value Date     07/26/2024    POTASSIUM 4.0 07/26/2024    CHLORIDE 107 07/26/2024    CO2 22 07/26/2024    ANIONGAP 8 07/26/2024    GLC 92 07/26/2024    BUN 14.5 07/26/2024    CR 1.13 07/26/2024    GFRESTIMATED 76 07/26/2024    ANGELA 9.0 07/26/2024      A1C RESULTS:  Lab Results   Component Value Date    A1C 5.1 07/25/2024     INR RESULTS:  No results found for: \"INR\"         Medications     Current Outpatient Medications   Medication Sig Dispense Refill     aspirin 81 MG EC tablet Take 1 tablet (81 mg) by mouth daily Start tomorrow. 30 tablet 3     Ibuprofen (ADVIL PO) Take 2 tablets by mouth as needed for other (pain)       lisinopril (ZESTRIL) 2.5 MG tablet Take 1 tablet (2.5 mg) by mouth daily. 90 tablet 3     nitroGLYcerin (NITROSTAT) 0.4 MG sublingual tablet For chest pain place 1 tablet under the tongue every 5 minutes for 3 doses. If symptoms persist 5 minutes after 1st dose call 911. 30 tablet 0     rosuvastatin (CRESTOR) 20 MG tablet Take 1 tablet (20 mg) by mouth daily. 90 tablet 3     ticagrelor (BRILINTA) 90 MG tablet Take 1 tablet (90 mg) by mouth 2 times daily. 180 tablet 2          Past Medical History   History reviewed. No pertinent past medical history.  Past Surgical History:   Procedure Laterality Date     CV CORONARY ANGIOGRAM N/A 7/25/2024    Procedure: Coronary Angiogram;  Surgeon: Tanner Barnett MD;  Location: [facility] CARDIAC CATH LAB     CV PCI N/A 7/25/2024    Procedure: Percutaneous Coronary Intervention;  " Surgeon: Tanner Barnett MD;  Location:  HEART CARDIAC CATH LAB     History reviewed. No pertinent family history.         Allergies   Aleve [naproxen]        Elaine Bernardo NP  [facility]  Pager: [phone]       Thank you for allowing me to participate in the care of your patient.      Sincerely,     Elaine Bernardo NP     [facility]  cc:   Alyssa Briscoe, DO  [address] [place] [address]

## 2024-09-25 ENCOUNTER — TELEPHONE (OUTPATIENT)
Dept: CARDIOLOGY | Facility: CLINIC | Age: 59
End: 2024-09-25
Payer: COMMERCIAL

## 2024-09-25 NOTE — TELEPHONE ENCOUNTER
Patient dropped off a form from [facility] asking about restrictions. Patient received stenting on 7/25/2024. Reviewed on form to delay any work for 6 months if possible.     Form to Dr. Escobar for review and signature      9/30/2024 0830 signed form faxed Friday afternoon as computer uplink was degrading. Form resent today as back up. Copy sent to scan.

## 2024-09-27 ENCOUNTER — TRANSFERRED RECORDS (OUTPATIENT)
Dept: HEALTH INFORMATION MANAGEMENT | Facility: CLINIC | Age: 59
End: 2024-09-27
Payer: COMMERCIAL

## 2024-10-18 ENCOUNTER — TELEPHONE (OUTPATIENT)
Dept: CARDIOLOGY | Facility: CLINIC | Age: 59
End: 2024-10-18
Payer: COMMERCIAL

## 2024-10-18 DIAGNOSIS — I20.0 UNSTABLE ANGINA (H): ICD-10-CM

## 2024-10-18 RX ORDER — ROSUVASTATIN CALCIUM 20 MG/1
20 TABLET, COATED ORAL DAILY
Qty: 90 TABLET | Refills: 3 | Status: SHIPPED | OUTPATIENT
Start: 2024-10-18

## 2024-10-18 RX ORDER — LISINOPRIL 2.5 MG/1
2.5 TABLET ORAL DAILY
Qty: 90 TABLET | Refills: 3 | Status: SHIPPED | OUTPATIENT
Start: 2024-10-18

## 2024-10-18 NOTE — TELEPHONE ENCOUNTER
I called pt and let him know his refills [facility]. Pt said he has a one week supply of his meds left, including his brilinta. Pt will call if he hasn't received his brilinta within the next several days. Iliana MCCULLOUGH October 18, 2024, 1:31 PM

## 2024-10-18 NOTE — TELEPHONE ENCOUNTER
Health Call Center    Phone Message    May a detailed message be left on voicemail: yes     Reason for Call: Other: Dennis called to inform Stacie that [facility] is sending over an urgent refill request for Dennis's medications and states that he will run out of his medications if the request isn't filled ASAP. Please keep an eye out for the request from [facility]. Thank you!     Action Taken: Other: Cardiology    Travel Screening: Not Applicable    Thank you!  [facility]       Date of Service:

## 2024-10-21 ENCOUNTER — LAB (OUTPATIENT)
Dept: LAB | Facility: CLINIC | Age: 59
End: 2024-10-21
Payer: COMMERCIAL

## 2024-10-21 DIAGNOSIS — I20.0 UNSTABLE ANGINA (H): ICD-10-CM

## 2024-10-21 DIAGNOSIS — I25.10 CORONARY ARTERY DISEASE INVOLVING NATIVE CORONARY ARTERY OF NATIVE HEART WITHOUT ANGINA PECTORIS: ICD-10-CM

## 2024-10-21 LAB
ALT SERPL W P-5'-P-CCNC: 19 U/L (ref 0–70)
CHOLEST SERPL-MCNC: 101 MG/DL
FASTING STATUS PATIENT QL REPORTED: YES
HDLC SERPL-MCNC: 32 MG/DL
LDLC SERPL CALC-MCNC: 56 MG/DL
NONHDLC SERPL-MCNC: 69 MG/DL
TRIGL SERPL-MCNC: 63 MG/DL

## 2024-10-21 PROCEDURE — 36415 COLL VENOUS BLD VENIPUNCTURE: CPT | Performed by: NURSE PRACTITIONER

## 2024-10-21 PROCEDURE — 84460 ALANINE AMINO (ALT) (SGPT): CPT | Performed by: NURSE PRACTITIONER

## 2024-10-21 PROCEDURE — 80061 LIPID PANEL: CPT | Performed by: NURSE PRACTITIONER

## (undated) DEVICE — MANIFOLD KIT ANGIO AUTOMATED 014613

## (undated) DEVICE — CATH LAUNCHER 6FR LA6EBU375

## (undated) DEVICE — NDL PERC ENTRY THINWALL 18GA 7.0" G00166

## (undated) DEVICE — CATH DIAG 4FR JL 4.5 538417

## (undated) DEVICE — TOTE ANGIO CORP PC15AT SAN32CC83O

## (undated) DEVICE — INFL DVC BASIXCOMPAK PLYCRB 30 ATM 13IN 20ML IN4530

## (undated) DEVICE — CATH BALLOON EMERGE 3.5X15MM H7493918915350

## (undated) DEVICE — KIT HAND CONTROL ANGIOTOUCH ACIST 65CM AT-P65

## (undated) DEVICE — GUIDEWIRE VASC 0.014INX180CM RUNTHROUGH 25-1011

## (undated) DEVICE — VALVE HEMOSTASIS .096" COPILOT MECH 1003331

## (undated) DEVICE — INTRODUCER SHEATH GREEN 6.5FRX11CM .038IN PSI-6F-11-038ACT

## (undated) DEVICE — DEFIB PRO-PADZ LVP LQD GEL ADULT 8900-2105-01

## (undated) DEVICE — CATH BALLOON NC EMERGE 3.50X20MM H7493926720350

## (undated) DEVICE — RAD INTRODUCER KIT MICRO 5FRX10CM .018 NITINOL G/W

## (undated) DEVICE — INTRO SHEATH 4FRX10CM PINNACLE RSS402

## (undated) DEVICE — RAD INFLATOR BASIC COMPAK  IN4130

## (undated) DEVICE — CATH ANGIO INFINITI 3DRC 4FRX100CM 538476

## (undated) RX ORDER — FENTANYL CITRATE 50 UG/ML
INJECTION, SOLUTION INTRAMUSCULAR; INTRAVENOUS
Status: DISPENSED
Start: 2024-07-25

## (undated) RX ORDER — LIDOCAINE HYDROCHLORIDE 10 MG/ML
INJECTION, SOLUTION EPIDURAL; INFILTRATION; INTRACAUDAL; PERINEURAL
Status: DISPENSED
Start: 2024-07-25

## (undated) RX ORDER — HEPARIN SODIUM 1000 [USP'U]/ML
INJECTION, SOLUTION INTRAVENOUS; SUBCUTANEOUS
Status: DISPENSED
Start: 2024-07-25

## (undated) RX ORDER — NITROGLYCERIN 5 MG/ML
VIAL (ML) INTRAVENOUS
Status: DISPENSED
Start: 2024-07-25